# Patient Record
Sex: MALE | Race: WHITE | NOT HISPANIC OR LATINO | Employment: OTHER | URBAN - METROPOLITAN AREA
[De-identification: names, ages, dates, MRNs, and addresses within clinical notes are randomized per-mention and may not be internally consistent; named-entity substitution may affect disease eponyms.]

---

## 2021-03-08 ENCOUNTER — APPOINTMENT (EMERGENCY)
Dept: RADIOLOGY | Facility: HOSPITAL | Age: 76
End: 2021-03-08
Payer: COMMERCIAL

## 2021-03-08 ENCOUNTER — HOSPITAL ENCOUNTER (EMERGENCY)
Facility: HOSPITAL | Age: 76
Discharge: HOME | End: 2021-03-08
Attending: EMERGENCY MEDICINE
Payer: COMMERCIAL

## 2021-03-08 VITALS
HEIGHT: 70 IN | OXYGEN SATURATION: 95 % | DIASTOLIC BLOOD PRESSURE: 72 MMHG | TEMPERATURE: 98.4 F | RESPIRATION RATE: 13 BRPM | SYSTOLIC BLOOD PRESSURE: 154 MMHG | HEART RATE: 71 BPM | BODY MASS INDEX: 24.93 KG/M2 | WEIGHT: 174.16 LBS

## 2021-03-08 DIAGNOSIS — R00.2 PALPITATIONS: Primary | ICD-10-CM

## 2021-03-08 DIAGNOSIS — R79.89 ELEVATED TSH: ICD-10-CM

## 2021-03-08 DIAGNOSIS — R03.0 ELEVATED BLOOD PRESSURE READING: ICD-10-CM

## 2021-03-08 LAB
ALBUMIN SERPL BCP-MCNC: 3.8 G/DL (ref 3.5–5)
ALP SERPL-CCNC: 63 U/L (ref 46–116)
ALT SERPL W P-5'-P-CCNC: 30 U/L (ref 12–78)
ANION GAP SERPL CALCULATED.3IONS-SCNC: 11 MMOL/L (ref 4–13)
APTT PPP: 25 SECONDS (ref 23–37)
AST SERPL W P-5'-P-CCNC: 18 U/L (ref 5–45)
BASOPHILS # BLD AUTO: 0.06 THOUSANDS/ΜL (ref 0–0.1)
BASOPHILS NFR BLD AUTO: 1 % (ref 0–1)
BILIRUB SERPL-MCNC: 0.3 MG/DL (ref 0.2–1)
BUN SERPL-MCNC: 22 MG/DL (ref 5–25)
CALCIUM SERPL-MCNC: 9.1 MG/DL (ref 8.3–10.1)
CHLORIDE SERPL-SCNC: 106 MMOL/L (ref 100–108)
CO2 SERPL-SCNC: 25 MMOL/L (ref 21–32)
CREAT SERPL-MCNC: 1.04 MG/DL (ref 0.6–1.3)
EOSINOPHIL # BLD AUTO: 0.4 THOUSAND/ΜL (ref 0–0.61)
EOSINOPHIL NFR BLD AUTO: 5 % (ref 0–6)
ERYTHROCYTE [DISTWIDTH] IN BLOOD BY AUTOMATED COUNT: 12.9 % (ref 11.6–15.1)
GFR SERPL CREATININE-BSD FRML MDRD: 69 ML/MIN/1.73SQ M
GLUCOSE SERPL-MCNC: 77 MG/DL (ref 65–140)
HCT VFR BLD AUTO: 44.6 % (ref 36.5–49.3)
HGB BLD-MCNC: 14.3 G/DL (ref 12–17)
IMM GRANULOCYTES # BLD AUTO: 0.01 THOUSAND/UL (ref 0–0.2)
IMM GRANULOCYTES NFR BLD AUTO: 0 % (ref 0–2)
INR PPP: 0.93 (ref 0.84–1.19)
LYMPHOCYTES # BLD AUTO: 2.35 THOUSANDS/ΜL (ref 0.6–4.47)
LYMPHOCYTES NFR BLD AUTO: 30 % (ref 14–44)
MCH RBC QN AUTO: 30 PG (ref 26.8–34.3)
MCHC RBC AUTO-ENTMCNC: 32.1 G/DL (ref 31.4–37.4)
MCV RBC AUTO: 94 FL (ref 82–98)
MONOCYTES # BLD AUTO: 0.84 THOUSAND/ΜL (ref 0.17–1.22)
MONOCYTES NFR BLD AUTO: 11 % (ref 4–12)
NEUTROPHILS # BLD AUTO: 4.3 THOUSANDS/ΜL (ref 1.85–7.62)
NEUTS SEG NFR BLD AUTO: 53 % (ref 43–75)
NRBC BLD AUTO-RTO: 0 /100 WBCS
NT-PROBNP SERPL-MCNC: 145 PG/ML
PLATELET # BLD AUTO: 195 THOUSANDS/UL (ref 149–390)
PMV BLD AUTO: 9.9 FL (ref 8.9–12.7)
POTASSIUM SERPL-SCNC: 3.8 MMOL/L (ref 3.5–5.3)
PROT SERPL-MCNC: 7.2 G/DL (ref 6.4–8.2)
PROTHROMBIN TIME: 12.5 SECONDS (ref 11.6–14.5)
RBC # BLD AUTO: 4.76 MILLION/UL (ref 3.88–5.62)
SODIUM SERPL-SCNC: 142 MMOL/L (ref 136–145)
TROPONIN I SERPL-MCNC: <0.02 NG/ML
TSH SERPL DL<=0.05 MIU/L-ACNC: 9.69 UIU/ML (ref 0.36–3.74)
WBC # BLD AUTO: 7.96 THOUSAND/UL (ref 4.31–10.16)

## 2021-03-08 PROCEDURE — 85025 COMPLETE CBC W/AUTO DIFF WBC: CPT | Performed by: EMERGENCY MEDICINE

## 2021-03-08 PROCEDURE — 71045 X-RAY EXAM CHEST 1 VIEW: CPT

## 2021-03-08 PROCEDURE — 36415 COLL VENOUS BLD VENIPUNCTURE: CPT | Performed by: EMERGENCY MEDICINE

## 2021-03-08 PROCEDURE — 84484 ASSAY OF TROPONIN QUANT: CPT | Performed by: EMERGENCY MEDICINE

## 2021-03-08 PROCEDURE — 99285 EMERGENCY DEPT VISIT HI MDM: CPT

## 2021-03-08 PROCEDURE — 85610 PROTHROMBIN TIME: CPT | Performed by: EMERGENCY MEDICINE

## 2021-03-08 PROCEDURE — 85730 THROMBOPLASTIN TIME PARTIAL: CPT | Performed by: EMERGENCY MEDICINE

## 2021-03-08 PROCEDURE — 83880 ASSAY OF NATRIURETIC PEPTIDE: CPT | Performed by: EMERGENCY MEDICINE

## 2021-03-08 PROCEDURE — 93005 ELECTROCARDIOGRAM TRACING: CPT

## 2021-03-08 PROCEDURE — 80053 COMPREHEN METABOLIC PANEL: CPT | Performed by: EMERGENCY MEDICINE

## 2021-03-08 PROCEDURE — 84443 ASSAY THYROID STIM HORMONE: CPT | Performed by: EMERGENCY MEDICINE

## 2021-03-08 PROCEDURE — 99285 EMERGENCY DEPT VISIT HI MDM: CPT | Performed by: EMERGENCY MEDICINE

## 2021-03-08 PROCEDURE — 84439 ASSAY OF FREE THYROXINE: CPT | Performed by: EMERGENCY MEDICINE

## 2021-03-08 RX ORDER — ASPIRIN 81 MG/1
324 TABLET, CHEWABLE ORAL ONCE
Status: COMPLETED | OUTPATIENT
Start: 2021-03-08 | End: 2021-03-08

## 2021-03-08 RX ADMIN — ASPIRIN 324 MG: 81 TABLET, CHEWABLE ORAL at 23:11

## 2021-03-09 LAB
ATRIAL RATE: 73 BPM
P AXIS: 61 DEGREES
PR INTERVAL: 156 MS
QRS AXIS: 59 DEGREES
QRSD INTERVAL: 106 MS
QT INTERVAL: 400 MS
QTC INTERVAL: 440 MS
T WAVE AXIS: 83 DEGREES
T4 FREE SERPL-MCNC: 0.86 NG/DL (ref 0.76–1.46)
VENTRICULAR RATE: 73 BPM

## 2021-03-09 PROCEDURE — 93010 ELECTROCARDIOGRAM REPORT: CPT | Performed by: INTERNAL MEDICINE

## 2021-03-09 NOTE — ED PROVIDER NOTES
History  Chief Complaint   Patient presents with    Rapid Heart Rate     7year-old male past medical history of stents x2 13 and 20 years ago, hypertension, hyperlipidemia, paroxysmal atrial flutter presents for evaluation of feeling unwell, palpitations that has been on and off since Friday  It is not exertional, not positional   This evening he states that the feeling was going on since 20:00 and was constant  He tried taking his statin without any relief  There is no associated chest pain or shortness of breath no nausea vomiting or diaphoresis, no lightheadedness, no headache  He states that he did not take his aspirin as he usually takes this in the evening  No history of DVT or PE, he states that he did start Flomax for his BPH and January otherwise no recent medication changes  He does not have current follow-up with the cardiologist as his cardiologist at [facility] has retired, he was supposed to follow up with [facility] as per note of [address] [facility] physician who he saw in January but has not done so yet  His last stress test was approximately 4 years ago, last echo was from 2018 during admission at [facility]   2D echo completed, reveals EF 50%, akinesis of mid to distal anteroseptal wall consistent with previous infarct (unchanged from 2014 echo)  This admission was for chest pain, had a negative observation with negative troponin X 3  Patient received his 1st dose of Covid Vaccination approximately 1 week ago, has not had any side effects  None       Past Medical History:   Diagnosis Date    Aortic atherosclerosis ([facility] [address] )     Arthropathy of knee     Atrial flutter (HCC)     Benign enlargement of prostate     Coronary atherosclerosis of native coronary artery     Erectile dysfunction     Hypertension     Impaired fasting glucose     Mild cognitive disorder     Mild depression (HCC)     Shoulder pain, bilateral        History reviewed  No pertinent surgical history  History reviewed  No pertinent family history  I have reviewed and agree with the history as documented  E-Cigarette/Vaping     E-Cigarette/Vaping Substances     Social History     Tobacco Use    Smoking status: Never Smoker    Smokeless tobacco: Never Used   Substance Use Topics    Alcohol use: Yes     Frequency: Monthly or less    Drug use: Never       Review of Systems   Constitutional: Positive for fatigue  Negative for appetite change, chills, diaphoresis and fever  HENT: Negative for rhinorrhea and sore throat  Eyes: Negative for photophobia and visual disturbance  Respiratory: Negative for cough and shortness of breath  Cardiovascular: Positive for palpitations  Negative for chest pain  Gastrointestinal: Negative for abdominal pain, diarrhea, nausea and vomiting  Genitourinary: Negative for dysuria, frequency and urgency  Skin: Negative for rash  Neurological: Negative for dizziness and weakness  All other systems reviewed and are negative  Physical Exam  Physical Exam  Vitals signs and nursing note reviewed  Constitutional:       Appearance: He is well-developed  HENT:      Head: Normocephalic and atraumatic  Right Ear: External ear normal       Left Ear: External ear normal    Eyes:      Conjunctiva/sclera: Conjunctivae normal       Pupils: Pupils are equal, round, and reactive to light  Neck:      Musculoskeletal: Normal range of motion and neck supple  Vascular: No JVD  Trachea: No tracheal deviation  Cardiovascular:      Rate and Rhythm: Normal rate and regular rhythm  Heart sounds: Normal heart sounds  No murmur  No friction rub  No gallop  Pulmonary:      Effort: Pulmonary effort is normal  No respiratory distress  Breath sounds: No stridor  No wheezing or rales  Abdominal:      General: There is no distension  Palpations: Abdomen is soft  There is no mass  Tenderness:  There is no abdominal tenderness  There is no guarding or rebound  Musculoskeletal: Normal range of motion  Right lower leg: No edema  Left lower leg: No edema  Skin:     General: Skin is warm and dry  Coloration: Skin is not pale  Findings: No erythema or rash  Neurological:      Mental Status: He is alert and oriented to person, place, and time  Cranial Nerves: No cranial nerve deficit           Vital Signs  ED Triage Vitals [03/08/21 2207]   Temperature Pulse Respirations Blood Pressure SpO2   98 4 °F (36 9 °C) 70 15 (!) 182/74 98 %      Temp Source Heart Rate Source Patient Position - Orthostatic VS BP Location FiO2 (%)   Tympanic Monitor Sitting Left arm --      Pain Score       --           Vitals:    03/08/21 2210 03/08/21 2230 03/08/21 2300 03/08/21 2310   BP: (!) 182/74 161/79 (!) 184/81 154/72   Pulse: 74 69 72 71   Patient Position - Orthostatic VS:             Visual Acuity      ED Medications  Medications   aspirin chewable tablet 324 mg (324 mg Oral Given 3/8/21 2311)       Diagnostic Studies  Results Reviewed     Procedure Component Value Units Date/Time    Comprehensive metabolic panel [[id number]] Collected: 03/08/21 2219    Lab Status: Final result Specimen: Blood from Arm, Right Updated: 03/08/21 2304     Sodium 142 mmol/L      Potassium 3 8 mmol/L      Chloride 106 mmol/L      CO2 25 mmol/L      ANION GAP 11 mmol/L      BUN 22 mg/dL      Creatinine 1 04 mg/dL      Glucose 77 mg/dL      Calcium 9 1 mg/dL      AST 18 U/L      ALT 30 U/L      Alkaline Phosphatase 63 U/L      Total Protein 7 2 g/dL      Albumin 3 8 g/dL      Total Bilirubin 0 30 mg/dL      eGFR 69 ml/min/1 73sq m     Narrative:      Aureliano guidelines for Chronic Kidney Disease (CKD):     Stage 1 with normal or high GFR (GFR > 90 mL/min/1 73 square meters)    Stage 2 Mild CKD (GFR = 60-89 mL/min/1 73 square meters)    Stage 3A Moderate CKD (GFR = 45-59 mL/min/1 73 square meters)   Stage 3B Moderate CKD (GFR = 30-44 mL/min/1 73 square meters)    Stage 4 Severe CKD (GFR = 15-29 mL/min/1 73 square meters)    Stage 5 End Stage CKD (GFR <15 mL/min/1 73 square meters)  Note: GFR calculation is accurate only with a steady state creatinine    NT-BNP PRO [[id number]]  (Normal) Collected: 03/08/21 2219    Lab Status: Final result Specimen: Blood from Arm, Right Updated: 03/08/21 2302     NT-proBNP 145 pg/mL     TSH, 3rd generation with Free T4 reflex [[id number]]  (Abnormal) Collected: 03/08/21 2219    Lab Status: Final result Specimen: Blood from Arm, Right Updated: 03/08/21 2258     TSH 3RD GENERATON 9 693 uIU/mL     Narrative:      Patients undergoing fluorescein dye angiography may retain small amounts of fluorescein in the body for 48-72 hours post procedure  Samples containing fluorescein can produce falsely depressed TSH values  If the patient had this procedure,a specimen should be resubmitted post fluorescein clearance  T4, free [id number] Collected: 03/08/21 2219    Lab Status:  In process Specimen: Blood from Arm, Right Updated: 03/08/21 2258    Troponin I [[id number]]  (Normal) Collected: 03/08/21 2219    Lab Status: Final result Specimen: Blood from Arm, Right Updated: 03/08/21 2252     Troponin I <0 02 ng/mL     Protime-INR [[id number]]  (Normal) Collected: 03/08/21 2219    Lab Status: Final result Specimen: Blood from Arm, Right Updated: 03/08/21 2244     Protime 12 5 seconds      INR 0 93    APTT [[id number]]  (Normal) Collected: 03/08/21 2219    Lab Status: Final result Specimen: Blood from Arm, Right Updated: 03/08/21 2244     PTT 25 seconds     CBC and differential [[id number]] Collected: 03/08/21 2219    Lab Status: Final result Specimen: Blood from Arm, Right Updated: 03/08/21 2231     WBC 7 96 Thousand/uL      RBC 4 76 Million/uL      Hemoglobin 14 3 g/dL      Hematocrit 44 6 %      MCV 94 fL      MCH 30 0 pg      MCHC 32 1 g/dL      RDW 12 9 %      MPV 9 9 fL      Platelets 397 Thousands/uL      nRBC 0 /100 WBCs      Neutrophils Relative 53 %      Immat GRANS % 0 %      Lymphocytes Relative 30 %      Monocytes Relative 11 %      Eosinophils Relative 5 %      Basophils Relative 1 %      Neutrophils Absolute 4 30 Thousands/µL      Immature Grans Absolute 0 01 Thousand/uL      Lymphocytes Absolute 2 35 Thousands/µL      Monocytes Absolute 0 84 Thousand/µL      Eosinophils Absolute 0 40 Thousand/µL      Basophils Absolute 0 06 Thousands/µL                  XR chest portable   ED Interpretation by Tedra Canavan, MD (03/08 2306)   This study was ordered and independently reviewed by me    No acute findings noted                    Procedures  Procedures         ED Course  ED Course as of Mar 08 2327   Mon Mar 08, 2021   2212 Procedure Note: EKG  Date/Time: 03/08/21 10:12 PM   Performed by: Renée Antonio  Authorized by: Renée Antonio  Indications / Diagnosis: Palpitations  ECG reviewed by me, the ED Provider: yes   The EKG demonstrates:  Rhythm: normal sinus  Intervals: normal intervals  Axis: normal axis  QRS/Blocks: normal QRS  ST Changes: No acute ST Changes, no STD/JOHN  Unchanged from previous in 2014 2306 Patient resting comfortably, no ectopy on monitor currently,  did have an occasional PVC previously which corresponded with the feeling of in the irregular heartbeat that he was describing  Blood pressure elevated, improved to 154/72 without medications however patient is asymptomatic  Will follow up with Cardiology for further care                                SBIRT 22yo+      Most Recent Value   SBIRT (25 yo +)   In order to provide better care to our patients, we are screening all of our patients for alcohol and drug use  Would it be okay to ask you these screening questions?   No Filed at: 03/08/2021 2220                    MDM  Number of Diagnoses or Management Options  Diagnosis management comments: 43-year-old male with past cardiac history presents for evaluation of several hours of feeling that his heart is beating irregularly and palpitations, does have history of such however on presentation patient is in sinus rhythm without any ectopy  EKG unremarkable  Will obtain cardiac workup, will re-evaluate      Disposition  Final diagnoses:   Palpitations   Elevated TSH   Elevated blood pressure reading     Time reflects when diagnosis was documented in both MDM as applicable and the Disposition within this note     Time User Action Codes Description Comment    3/8/2021 11:03 PM Margeret Pink Add [R00 2] Palpitations     3/8/2021 11:03 PM Margeret Pink Add [R79 89] Elevated TSH     3/8/2021 11:08 PM Margeret Pink Add [R03 0] Elevated blood pressure reading       ED Disposition     ED Disposition Condition Date/Time Comment    Discharge Stable Mon Mar 8, 2021 11:06 PM Abiel Schuster discharge to home/self care  Follow-up Information     Follow up With Specialties Details Why Contact Info Additional Information    Jose Ryan MD Family Medicine Schedule an appointment as soon as possible for a visit   [facility] [address] [facility] Emergency Department Emergency Medicine  If symptoms worsen [address] [place], [id number]  [address] [facility] Emergency Department, [address] [place] [address] [address]    [address] Cardiology Schedule an appointment as soon as possible for a visit   [address] [facility] [id number]  [address]  Cardiology [address], [address] [place], [place], [address] [phone]          Patient's Medications    No medications on file     Outpatient Discharge Orders   Holter monitor - 48 hour   Standing Status: Future Standing Exp   Date: 03/08/25       PDMP Review     None          ED Provider  Electronically Signed by           Matt Stephenson Cristian Mendez MD  03/08/21 0118

## 2021-03-09 NOTE — ED TRIAGE NOTES
Pt presents to the ED with c/o 'not feeling well' he states that for the last week he has not felt his baseline with sensations of blood rushing in the back of his head  Tonight he states he developed a sensation of 'increased heart rate' he denies palpations, chest pain, shortness of breath, or other associated symptoms but has concerns due to 2 prior stent placements for MI's

## 2021-03-09 NOTE — DISCHARGE INSTRUCTIONS
Please follow-up with cardiology for further care    Your TSH was elevated today's visit which may mean that your thyroid is under active, please follow-up with your PCP for further evaluation       Your blood pressure was elevated today's visit, please follow-up with your primary care provider for recheck

## 2021-08-23 ENCOUNTER — APPOINTMENT (OUTPATIENT)
Dept: RADIOLOGY | Facility: CLINIC | Age: 76
End: 2021-08-23
Payer: COMMERCIAL

## 2021-08-23 DIAGNOSIS — M19.90 INFLAMMATORY ARTHRITIS: ICD-10-CM

## 2021-08-23 PROCEDURE — 73130 X-RAY EXAM OF HAND: CPT

## 2021-08-30 ENCOUNTER — HOSPITAL ENCOUNTER (OUTPATIENT)
Dept: MRI IMAGING | Facility: HOSPITAL | Age: 76
Discharge: HOME | End: 2021-08-30
Payer: COMMERCIAL

## 2021-08-30 DIAGNOSIS — R53.83 FATIGUE, UNSPECIFIED TYPE: ICD-10-CM

## 2021-08-30 DIAGNOSIS — R53.81 MALAISE: ICD-10-CM

## 2021-08-30 DIAGNOSIS — F03.90 DEMENTIA WITHOUT BEHAVIORAL DISTURBANCE (HCC): ICD-10-CM

## 2021-08-30 PROCEDURE — A9585 GADOBUTROL INJECTION: HCPCS | Performed by: RADIOLOGY

## 2021-08-30 PROCEDURE — 70553 MRI BRAIN STEM W/O & W/DYE: CPT

## 2021-08-30 RX ADMIN — GADOBUTROL 7 ML: 604.72 INJECTION INTRAVENOUS at 12:06

## 2022-05-06 ENCOUNTER — APPOINTMENT (OUTPATIENT)
Dept: RADIOLOGY | Facility: CLINIC | Age: 77
End: 2022-05-06
Payer: COMMERCIAL

## 2022-05-06 DIAGNOSIS — M54.32 LEFT SIDED SCIATICA: ICD-10-CM

## 2022-05-06 PROCEDURE — 72110 X-RAY EXAM L-2 SPINE 4/>VWS: CPT

## 2025-05-18 ENCOUNTER — APPOINTMENT (EMERGENCY)
Dept: CT IMAGING | Facility: HOSPITAL | Age: 80
DRG: 175 | End: 2025-05-18
Payer: COMMERCIAL

## 2025-05-18 ENCOUNTER — HOSPITAL ENCOUNTER (INPATIENT)
Facility: HOSPITAL | Age: 80
LOS: 2 days | Discharge: HOME | DRG: 175 | End: 2025-05-21
Attending: EMERGENCY MEDICINE | Admitting: INTERNAL MEDICINE
Payer: COMMERCIAL

## 2025-05-18 DIAGNOSIS — N28.9 LESION OF RIGHT NATIVE KIDNEY: ICD-10-CM

## 2025-05-18 DIAGNOSIS — I71.40 ABDOMINAL AORTIC ANEURYSM (AAA) (HCC): ICD-10-CM

## 2025-05-18 DIAGNOSIS — J18.9 PNA (PNEUMONIA): ICD-10-CM

## 2025-05-18 DIAGNOSIS — I26.99 PULMONARY EMBOLISM (HCC): Primary | ICD-10-CM

## 2025-05-18 PROBLEM — F41.9 ANXIETY: Status: ACTIVE | Noted: 2025-05-18

## 2025-05-18 PROBLEM — I25.10 CAD (CORONARY ARTERY DISEASE): Status: ACTIVE | Noted: 2025-05-18

## 2025-05-18 PROBLEM — E78.2 MIXED HYPERLIPIDEMIA: Status: ACTIVE | Noted: 2025-05-18

## 2025-05-18 PROBLEM — I10 HYPERTENSION: Status: ACTIVE | Noted: 2025-05-18

## 2025-05-18 PROBLEM — N40.1 BENIGN PROSTATIC HYPERPLASIA WITH LOWER URINARY TRACT SYMPTOMS: Status: ACTIVE | Noted: 2025-05-18

## 2025-05-18 LAB
2HR DELTA HS TROPONIN: 3 NG/L
ALBUMIN SERPL BCG-MCNC: 4 G/DL (ref 3.5–5)
ALP SERPL-CCNC: 58 U/L (ref 34–104)
ALT SERPL W P-5'-P-CCNC: 12 U/L (ref 7–52)
ANION GAP SERPL CALCULATED.3IONS-SCNC: 6 MMOL/L (ref 4–13)
APTT PPP: 158 SECONDS (ref 23–34)
APTT PPP: 29 SECONDS (ref 23–34)
AST SERPL W P-5'-P-CCNC: 17 U/L (ref 13–39)
ATRIAL RATE: 85 BPM
BACTERIA UR QL AUTO: NORMAL /HPF
BASOPHILS # BLD AUTO: 0.02 THOUSANDS/ÂΜL (ref 0–0.1)
BASOPHILS NFR BLD AUTO: 0 % (ref 0–1)
BILIRUB SERPL-MCNC: 0.73 MG/DL (ref 0.2–1)
BILIRUB UR QL STRIP: NEGATIVE
BNP SERPL-MCNC: 71 PG/ML (ref 0–100)
BUN SERPL-MCNC: 22 MG/DL (ref 5–25)
CALCIUM SERPL-MCNC: 9 MG/DL (ref 8.4–10.2)
CARDIAC TROPONIN I PNL SERPL HS: 11 NG/L (ref ?–50)
CARDIAC TROPONIN I PNL SERPL HS: 14 NG/L (ref ?–50)
CHLORIDE SERPL-SCNC: 106 MMOL/L (ref 96–108)
CLARITY UR: CLEAR
CO2 SERPL-SCNC: 25 MMOL/L (ref 21–32)
COLOR UR: YELLOW
CREAT SERPL-MCNC: 0.97 MG/DL (ref 0.6–1.3)
EOSINOPHIL # BLD AUTO: 0.06 THOUSAND/ÂΜL (ref 0–0.61)
EOSINOPHIL NFR BLD AUTO: 1 % (ref 0–6)
ERYTHROCYTE [DISTWIDTH] IN BLOOD BY AUTOMATED COUNT: 13.5 % (ref 11.6–15.1)
EST. AVERAGE GLUCOSE BLD GHB EST-MCNC: 114 MG/DL
GFR SERPL CREATININE-BSD FRML MDRD: 72 ML/MIN/1.73SQ M
GLUCOSE SERPL-MCNC: 110 MG/DL (ref 65–140)
GLUCOSE UR STRIP-MCNC: NEGATIVE MG/DL
HBA1C MFR BLD: 5.6 %
HCT VFR BLD AUTO: 42.6 % (ref 36.5–49.3)
HGB BLD-MCNC: 13.9 G/DL (ref 12–17)
HGB UR QL STRIP.AUTO: NEGATIVE
IMM GRANULOCYTES # BLD AUTO: 0.03 THOUSAND/UL (ref 0–0.2)
IMM GRANULOCYTES NFR BLD AUTO: 0 % (ref 0–2)
INR PPP: 1.06 (ref 0.85–1.19)
KETONES UR STRIP-MCNC: ABNORMAL MG/DL
LACTATE SERPL-SCNC: 0.8 MMOL/L (ref 0.5–2)
LEUKOCYTE ESTERASE UR QL STRIP: NEGATIVE
LIPASE SERPL-CCNC: 19 U/L (ref 11–82)
LYMPHOCYTES # BLD AUTO: 0.67 THOUSANDS/ÂΜL (ref 0.6–4.47)
LYMPHOCYTES NFR BLD AUTO: 7 % (ref 14–44)
MCH RBC QN AUTO: 30.7 PG (ref 26.8–34.3)
MCHC RBC AUTO-ENTMCNC: 32.6 G/DL (ref 31.4–37.4)
MCV RBC AUTO: 94 FL (ref 82–98)
MONOCYTES # BLD AUTO: 1.17 THOUSAND/ÂΜL (ref 0.17–1.22)
MONOCYTES NFR BLD AUTO: 13 % (ref 4–12)
NEUTROPHILS # BLD AUTO: 7.33 THOUSANDS/ÂΜL (ref 1.85–7.62)
NEUTS SEG NFR BLD AUTO: 79 % (ref 43–75)
NITRITE UR QL STRIP: NEGATIVE
NON-SQ EPI CELLS URNS QL MICRO: NORMAL /HPF
NRBC BLD AUTO-RTO: 0 /100 WBCS
P AXIS: 63 DEGREES
PH UR STRIP.AUTO: 6.5 [PH]
PLATELET # BLD AUTO: 177 THOUSANDS/UL (ref 149–390)
PMV BLD AUTO: 9.8 FL (ref 8.9–12.7)
POTASSIUM SERPL-SCNC: 4.6 MMOL/L (ref 3.5–5.3)
PR INTERVAL: 158 MS
PROT SERPL-MCNC: 6.6 G/DL (ref 6.4–8.4)
PROT UR STRIP-MCNC: ABNORMAL MG/DL
PROTHROMBIN TIME: 14.4 SECONDS (ref 12.3–15)
QRS AXIS: 28 DEGREES
QRSD INTERVAL: 102 MS
QT INTERVAL: 346 MS
QTC INTERVAL: 411 MS
RBC # BLD AUTO: 4.53 MILLION/UL (ref 3.88–5.62)
RBC #/AREA URNS AUTO: NORMAL /HPF
SODIUM SERPL-SCNC: 137 MMOL/L (ref 135–147)
SP GR UR STRIP.AUTO: <1.005 (ref 1–1.03)
T WAVE AXIS: 80 DEGREES
UROBILINOGEN UR STRIP-ACNC: <2 MG/DL
VENTRICULAR RATE: 85 BPM
WBC # BLD AUTO: 9.28 THOUSAND/UL (ref 4.31–10.16)
WBC #/AREA URNS AUTO: NORMAL /HPF

## 2025-05-18 PROCEDURE — 84484 ASSAY OF TROPONIN QUANT: CPT | Performed by: EMERGENCY MEDICINE

## 2025-05-18 PROCEDURE — 83880 ASSAY OF NATRIURETIC PEPTIDE: CPT | Performed by: EMERGENCY MEDICINE

## 2025-05-18 PROCEDURE — 96360 HYDRATION IV INFUSION INIT: CPT

## 2025-05-18 PROCEDURE — 83690 ASSAY OF LIPASE: CPT | Performed by: EMERGENCY MEDICINE

## 2025-05-18 PROCEDURE — 83605 ASSAY OF LACTIC ACID: CPT | Performed by: EMERGENCY MEDICINE

## 2025-05-18 PROCEDURE — 80053 COMPREHEN METABOLIC PANEL: CPT | Performed by: EMERGENCY MEDICINE

## 2025-05-18 PROCEDURE — 36415 COLL VENOUS BLD VENIPUNCTURE: CPT | Performed by: EMERGENCY MEDICINE

## 2025-05-18 PROCEDURE — 99285 EMERGENCY DEPT VISIT HI MDM: CPT | Performed by: EMERGENCY MEDICINE

## 2025-05-18 PROCEDURE — 96374 THER/PROPH/DIAG INJ IV PUSH: CPT

## 2025-05-18 PROCEDURE — 85730 THROMBOPLASTIN TIME PARTIAL: CPT | Performed by: INTERNAL MEDICINE

## 2025-05-18 PROCEDURE — 74177 CT ABD & PELVIS W/CONTRAST: CPT

## 2025-05-18 PROCEDURE — 85730 THROMBOPLASTIN TIME PARTIAL: CPT | Performed by: EMERGENCY MEDICINE

## 2025-05-18 PROCEDURE — 85610 PROTHROMBIN TIME: CPT | Performed by: EMERGENCY MEDICINE

## 2025-05-18 PROCEDURE — 96361 HYDRATE IV INFUSION ADD-ON: CPT

## 2025-05-18 PROCEDURE — 83036 HEMOGLOBIN GLYCOSYLATED A1C: CPT | Performed by: INTERNAL MEDICINE

## 2025-05-18 PROCEDURE — 85025 COMPLETE CBC W/AUTO DIFF WBC: CPT | Performed by: EMERGENCY MEDICINE

## 2025-05-18 PROCEDURE — 81001 URINALYSIS AUTO W/SCOPE: CPT | Performed by: EMERGENCY MEDICINE

## 2025-05-18 PROCEDURE — 99222 1ST HOSP IP/OBS MODERATE 55: CPT | Performed by: INTERNAL MEDICINE

## 2025-05-18 PROCEDURE — 93010 ELECTROCARDIOGRAM REPORT: CPT | Performed by: INTERNAL MEDICINE

## 2025-05-18 PROCEDURE — 93005 ELECTROCARDIOGRAM TRACING: CPT

## 2025-05-18 PROCEDURE — 96375 TX/PRO/DX INJ NEW DRUG ADDON: CPT

## 2025-05-18 PROCEDURE — 99284 EMERGENCY DEPT VISIT MOD MDM: CPT

## 2025-05-18 RX ORDER — ONDANSETRON 2 MG/ML
4 INJECTION INTRAMUSCULAR; INTRAVENOUS ONCE
Status: COMPLETED | OUTPATIENT
Start: 2025-05-18 | End: 2025-05-18

## 2025-05-18 RX ORDER — SIMETHICONE 80 MG
80 TABLET,CHEWABLE ORAL 4 TIMES DAILY PRN
Status: DISCONTINUED | OUTPATIENT
Start: 2025-05-18 | End: 2025-05-21 | Stop reason: HOSPADM

## 2025-05-18 RX ORDER — TAMSULOSIN HYDROCHLORIDE 0.4 MG/1
0.4 CAPSULE ORAL DAILY
COMMUNITY

## 2025-05-18 RX ORDER — ACETAMINOPHEN 325 MG/1
650 TABLET ORAL EVERY 4 HOURS PRN
Status: DISCONTINUED | OUTPATIENT
Start: 2025-05-18 | End: 2025-05-21 | Stop reason: HOSPADM

## 2025-05-18 RX ORDER — HEPARIN SODIUM 1000 [USP'U]/ML
2800 INJECTION, SOLUTION INTRAVENOUS; SUBCUTANEOUS EVERY 6 HOURS PRN
Status: DISCONTINUED | OUTPATIENT
Start: 2025-05-18 | End: 2025-05-20

## 2025-05-18 RX ORDER — TAMSULOSIN HYDROCHLORIDE 0.4 MG/1
0.4 CAPSULE ORAL
Status: DISCONTINUED | OUTPATIENT
Start: 2025-05-18 | End: 2025-05-21 | Stop reason: HOSPADM

## 2025-05-18 RX ORDER — KETOROLAC TROMETHAMINE 30 MG/ML
15 INJECTION, SOLUTION INTRAMUSCULAR; INTRAVENOUS ONCE
Status: COMPLETED | OUTPATIENT
Start: 2025-05-18 | End: 2025-05-18

## 2025-05-18 RX ORDER — AMLODIPINE BESYLATE 5 MG/1
5 TABLET ORAL DAILY
Status: DISCONTINUED | OUTPATIENT
Start: 2025-05-18 | End: 2025-05-18

## 2025-05-18 RX ORDER — HEPARIN SODIUM 1000 [USP'U]/ML
5600 INJECTION, SOLUTION INTRAVENOUS; SUBCUTANEOUS ONCE
Status: COMPLETED | OUTPATIENT
Start: 2025-05-18 | End: 2025-05-18

## 2025-05-18 RX ORDER — ESCITALOPRAM OXALATE 10 MG/1
10 TABLET ORAL DAILY
Status: DISCONTINUED | OUTPATIENT
Start: 2025-05-18 | End: 2025-05-21 | Stop reason: HOSPADM

## 2025-05-18 RX ORDER — ESCITALOPRAM OXALATE 10 MG/1
10 TABLET ORAL DAILY
COMMUNITY

## 2025-05-18 RX ORDER — ASPIRIN 81 MG/1
81 TABLET, CHEWABLE ORAL DAILY
Status: DISCONTINUED | OUTPATIENT
Start: 2025-05-19 | End: 2025-05-19

## 2025-05-18 RX ORDER — ATORVASTATIN CALCIUM 40 MG/1
40 TABLET, FILM COATED ORAL
Status: DISCONTINUED | OUTPATIENT
Start: 2025-05-18 | End: 2025-05-21 | Stop reason: HOSPADM

## 2025-05-18 RX ORDER — ASPIRIN 81 MG/1
81 TABLET ORAL DAILY
COMMUNITY
End: 2025-05-21

## 2025-05-18 RX ORDER — DOCUSATE SODIUM 100 MG/1
100 CAPSULE, LIQUID FILLED ORAL 2 TIMES DAILY
Status: DISCONTINUED | OUTPATIENT
Start: 2025-05-18 | End: 2025-05-21 | Stop reason: HOSPADM

## 2025-05-18 RX ORDER — LISINOPRIL 5 MG/1
5 TABLET ORAL DAILY
Status: DISCONTINUED | OUTPATIENT
Start: 2025-05-18 | End: 2025-05-18

## 2025-05-18 RX ORDER — HEPARIN SODIUM 1000 [USP'U]/ML
5600 INJECTION, SOLUTION INTRAVENOUS; SUBCUTANEOUS EVERY 6 HOURS PRN
Status: DISCONTINUED | OUTPATIENT
Start: 2025-05-18 | End: 2025-05-20

## 2025-05-18 RX ORDER — HEPARIN SODIUM 10000 [USP'U]/100ML
3-30 INJECTION, SOLUTION INTRAVENOUS
Status: DISPENSED | OUTPATIENT
Start: 2025-05-18 | End: 2025-05-19

## 2025-05-18 RX ORDER — ONDANSETRON 2 MG/ML
4 INJECTION INTRAMUSCULAR; INTRAVENOUS EVERY 6 HOURS PRN
Status: DISCONTINUED | OUTPATIENT
Start: 2025-05-18 | End: 2025-05-21 | Stop reason: HOSPADM

## 2025-05-18 RX ORDER — ROSUVASTATIN CALCIUM 20 MG/1
20 TABLET, COATED ORAL DAILY
COMMUNITY

## 2025-05-18 RX ADMIN — SODIUM CHLORIDE 1000 ML: 0.9 INJECTION, SOLUTION INTRAVENOUS at 10:35

## 2025-05-18 RX ADMIN — HEPARIN SODIUM 18 UNITS/KG/HR: 10000 INJECTION, SOLUTION INTRAVENOUS at 14:38

## 2025-05-18 RX ADMIN — ATORVASTATIN CALCIUM 40 MG: 40 TABLET, FILM COATED ORAL at 17:23

## 2025-05-18 RX ADMIN — ONDANSETRON 4 MG: 2 INJECTION, SOLUTION INTRAMUSCULAR; INTRAVENOUS at 10:36

## 2025-05-18 RX ADMIN — ACETAMINOPHEN 650 MG: 325 TABLET, FILM COATED ORAL at 20:14

## 2025-05-18 RX ADMIN — IOHEXOL 100 ML: 350 INJECTION, SOLUTION INTRAVENOUS at 11:45

## 2025-05-18 RX ADMIN — TAMSULOSIN HYDROCHLORIDE 0.4 MG: 0.4 CAPSULE ORAL at 17:22

## 2025-05-18 RX ADMIN — KETOROLAC TROMETHAMINE 15 MG: 30 INJECTION, SOLUTION INTRAMUSCULAR; INTRAVENOUS at 11:05

## 2025-05-18 RX ADMIN — ESCITALOPRAM OXALATE 10 MG: 10 TABLET ORAL at 17:23

## 2025-05-18 RX ADMIN — HEPARIN SODIUM 5600 UNITS: 1000 INJECTION INTRAVENOUS; SUBCUTANEOUS at 14:38

## 2025-05-18 NOTE — ASSESSMENT & PLAN NOTE
Presented with R flank pain and was found to have new pulmonary embolism  No recent travel  History of shoulder arthroplasty in early March of this year  Denies any chest pain    Comfortable on room air    CT chest-Segmental and subsegmental right lower lobe pulmonary emboli. Groundglass density in the posterior right lung base probably represents atelectasis but developing early changes of pulmonary infarction not excluded.   CT abdomen/pelvis-complex right renal lesion, concerning for complex cyst versus mass    No concern for right heart strain  Continue with telemetry  Started on IV heparin GTT, to be transition to DOAC in 24 hours  Echo ordered  Appreciate hematology recommendations  Follow-up renal ultrasound

## 2025-05-18 NOTE — PLAN OF CARE
Problem: PAIN - ADULT  Goal: Verbalizes/displays adequate comfort level or baseline comfort level  Description: Interventions:  - Encourage patient to monitor pain and request assistance  - Assess pain using appropriate pain scale  - Administer analgesics as ordered based on type and severity of pain and evaluate response  - Implement non-pharmacological measures as appropriate and evaluate response  - Consider cultural and social influences on pain and pain management  - Notify physician/advanced practitioner if interventions unsuccessful or patient reports new pain  - Educate patient/family on pain management process including their role and importance of  reporting pain   - Provide non-pharmacologic/complimentary pain relief interventions  Outcome: Progressing     Problem: INFECTION - ADULT  Goal: Absence or prevention of progression during hospitalization  Description: INTERVENTIONS:  - Assess and monitor for signs and symptoms of infection  - Monitor lab/diagnostic results  - Monitor all insertion sites, i.e. indwelling lines, tubes, and drains  - Administer medications as ordered  - Instruct and encourage patient and family to use good hand hygiene technique  - Identify and instruct in appropriate isolation precautions for identified infection/condition  Outcome: Progressing     Problem: SAFETY ADULT  Goal: Patient will remain free of falls  Description: INTERVENTIONS:  - Educate patient/family on patient safety including physical limitations  - Instruct patient to call for assistance with activity   - Consider consulting OT/PT to assist with strengthening/mobility based on AM PAC & JH-HLM score  - Consult OT/PT to assist with strengthening/mobility   - Keep Call bell within reach  - Keep bed low and locked with side rails adjusted as appropriate  - Keep care items and personal belongings within reach  - Initiate and maintain comfort rounds  - Make Fall Risk Sign visible to staff  - Offer Toileting every 2  Hours, in advance of need  - Initiate/Maintain bed alarm  - Obtain necessary fall risk management equipment: socks  - Apply yellow socks and bracelet for high fall risk patients  - Consider moving patient to room near nurses station  Outcome: Progressing  Goal: Maintain or return to baseline ADL function  Description: INTERVENTIONS:  -  Assess patient's ability to carry out ADLs; assess patient's baseline for ADL function and identify physical deficits which impact ability to perform ADLs (bathing, care of mouth/teeth, toileting, grooming, dressing, etc.)  - Assess/evaluate cause of self-care deficits   - Assess range of motion  - Assess patient's mobility; develop plan if impaired  - Assess patient's need for assistive devices and provide as appropriate  - Encourage maximum independence but intervene and supervise when necessary  - Involve family in performance of ADLs  - Assess for home care needs following discharge   - Consider OT consult to assist with ADL evaluation and planning for discharge  - Provide patient education as appropriate  - Monitor functional capacity and physical performance, use of AM PAC & JH-HLM   - Monitor gait, balance and fatigue with ambulation    Outcome: Progressing  Goal: Maintains/Returns to pre admission functional level  Description: INTERVENTIONS:  - Perform AM-PAC 6 Click Basic Mobility/ Daily Activity assessment daily.  - Set and communicate daily mobility goal to care team and patient/family/caregiver.   - Collaborate with rehabilitation services on mobility goals if consulted  - Perform Range of Motion 3 times a day.  - Reposition patient every 2 hours.  - Dangle patient 3 times a day  - Stand patient 3 times a day  - Ambulate patient 3 times a day  - Out of bed to chair 3 times a day   - Out of bed for meals 3 times a day  - Out of bed for toileting  - Record patient progress and toleration of activity level   Outcome: Progressing     Problem: DISCHARGE PLANNING  Goal:  Discharge to home or other facility with appropriate resources  Description: INTERVENTIONS:  - Identify barriers to discharge w/patient and caregiver  - Arrange for needed discharge resources and transportation as appropriate  - Identify discharge learning needs (meds, wound care, etc.)  - Arrange for interpretive services to assist at discharge as needed  - Refer to Case Management Department for coordinating discharge planning if the patient needs post-hospital services based on physician/advanced practitioner order or complex needs related to functional status, cognitive ability, or social support system  Outcome: Progressing     Problem: Knowledge Deficit  Goal: Patient/family/caregiver demonstrates understanding of disease process, treatment plan, medications, and discharge instructions  Description: Complete learning assessment and assess knowledge base.  Interventions:  - Provide teaching at level of understanding  - Provide teaching via preferred learning methods  Outcome: Progressing

## 2025-05-18 NOTE — ED PROVIDER NOTES
Time reflects when diagnosis was documented in both MDM as applicable and the Disposition within this note       Time User Action Codes Description Comment    5/18/2025  1:21 PM Nayana Colbert Add [I26.99] Pulmonary embolism (HCC)     5/18/2025  1:27 PM Nayana Colbert Add [N28.9] Lesion of right native kidney     5/18/2025  1:28 PM Nayana Colbert Add [I71.40] Abdominal aortic aneurysm (AAA) (HCC)           ED Disposition       ED Disposition   Admit    Condition   Stable    Date/Time   Sun May 18, 2025  1:33 PM    Comment   Case was discussed with LEE and the patient's admission status was agreed to be Admission Status: observation status to the service of Dr. Bowen .               Assessment & Plan       Medical Decision Making  79 year old male presents for evaluation of right sided flank pain which woke him at 4 am this morning.  No CVA tenderness on exam.  Patient denies radiation of pain into the abdomen; however, significant RUQ tenderness with positive Milner's sign.  LFTs and lipase WNL making choledocholithiasis unlikely.  CT without signs of cholecystitis; however, patient found to have pulmonary emboli in the right lower lung with possible pulmonary infarct.  VTE heparin ordered.  Patient informed of incidental aaa and renal lesion which will require further evaluation.  Patient admitted for further evaluation and management of PE.    Amount and/or Complexity of Data Reviewed  Labs: ordered.  Radiology: ordered.    Risk  Prescription drug management.  Decision regarding hospitalization.             Medications   heparin (VTE/PE) high (has no administration in time range)   ondansetron (ZOFRAN) injection 4 mg (4 mg Intravenous Given 5/18/25 1036)   sodium chloride 0.9 % bolus 1,000 mL (0 mL Intravenous Stopped 5/18/25 1222)   ketorolac (TORADOL) injection 15 mg (15 mg Intravenous Given 5/18/25 1105)   iohexol (OMNIPAQUE) 350 MG/ML injection (MULTI-DOSE) 100 mL (100 mL Intravenous Given  "5/18/25 1145)       ED Risk Strat Scores                    No data recorded                            History of Present Illness       Chief Complaint   Patient presents with    Flank Pain     Pt states \"I think I have another kidney stone. I woke up with left side back pain.\" Pt denies any current urinary complications.        Past Medical History:   Diagnosis Date    Aortic atherosclerosis (HCC)     Arthropathy of knee     Atrial flutter (HCC)     Benign enlargement of prostate     Coronary atherosclerosis of native coronary artery     Erectile dysfunction     Hypertension     Impaired fasting glucose     Mild cognitive disorder     Mild depression     Shoulder pain, bilateral       History reviewed. No pertinent surgical history.   History reviewed. No pertinent family history.   Social History[1]   E-Cigarette/Vaping      E-Cigarette/Vaping Substances      I have reviewed and agree with the history as documented.     79 year old male presents for evaluation of sharp right flank pain which woke him from sleep at 4 am this morning.  Pain does not radiate.  He states that the pain feels similar to when he had a kidney stone on the left side.  He reports mild nausea this morning with recent nausea, vomiting and diarrhea over the past 4 days.  Vomiting and diarrhea have since resolved.  He states he feels bloated; however, this has been present for a long while and resolves with defecation.  No fevers or chills.  No hematuria, dysuria or frequency.      Flank Pain      Review of Systems   Genitourinary:  Positive for flank pain.           Objective       ED Triage Vitals [05/18/25 0947]   Temperature Pulse Blood Pressure Respirations SpO2 Patient Position - Orthostatic VS   99.1 °F (37.3 °C) 85 144/66 20 98 % Sitting      Temp Source Heart Rate Source BP Location FiO2 (%) Pain Score    Temporal Monitor Left arm -- 8      Vitals      Date and Time Temp Pulse SpO2 Resp BP Pain Score FACES Pain Rating User   05/18/25 " 1220 -- 83 95 % 20 111/62 -- -- AM   05/18/25 1105 -- -- -- -- -- 8 -- AM   05/18/25 1100 -- 83 92 % 20 127/59 -- -- AM   05/18/25 0947 99.1 °F (37.3 °C) 85 98 % 20 144/66 8 --             Physical Exam  Vitals and nursing note reviewed.     Cardiovascular:      Rate and Rhythm: Normal rate and regular rhythm.      Pulses: Normal pulses.   Pulmonary:      Effort: Pulmonary effort is normal. No respiratory distress.   Abdominal:      General: There is distension.      Palpations: Abdomen is soft.      Tenderness: There is abdominal tenderness in the right upper quadrant. There is guarding. There is no right CVA tenderness, left CVA tenderness or rebound. Positive signs include Milner's sign.     Neurological:      Mental Status: He is alert.         Results Reviewed       Procedure Component Value Units Date/Time    HS Troponin 0hr (reflex protocol) [[id number]]     Lab Status: No result Specimen: Blood     B-Type Natriuretic Peptide(BNP) [[id number]]     Lab Status: No result Specimen: Blood     Protime-INR [[id number]]     Lab Status: No result Specimen: Blood     APTT [[id number]]     Lab Status: No result Specimen: Blood     Lactic acid, plasma (w/reflex if result > 2.0) [[id number]]     Lab Status: No result Specimen: Blood     Urine Microscopic [[id number]]  (Normal) Collected: 05/18/25 1224    Lab Status: Final result Specimen: Urine, Clean Catch Updated: 05/18/25 1303     RBC, UA None Seen /hpf      WBC, UA 0-1 /hpf      Epithelial Cells Occasional /hpf      Bacteria, UA None Seen /hpf     UA w Reflex to Microscopic w Reflex to Culture [[id number]]  (Abnormal) Collected: 05/18/25 1224    Lab Status: Final result Specimen: Urine, Clean Catch Updated: 05/18/25 1231     Color, UA Yellow     Clarity, UA Clear     Specific Gravity, UA <1.005     pH, UA 6.5     Leukocytes, UA Negative     Nitrite, UA Negative     Protein, UA 30 (1+) mg/dl      Glucose, UA Negative mg/dl      Ketones, UA 10 (1+) mg/dl      Urobilinogen,  UA <2.0 mg/dl      Bilirubin, UA Negative     Occult Blood, UA Negative    Comprehensive metabolic panel [[id number]] Collected: 05/18/25 1036    Lab Status: Final result Specimen: Blood from Arm, Left Updated: 05/18/25 1109     Sodium 137 mmol/L      Potassium 4.6 mmol/L      Chloride 106 mmol/L      CO2 25 mmol/L      ANION GAP 6 mmol/L      BUN 22 mg/dL      Creatinine 0.97 mg/dL      Glucose 110 mg/dL      Calcium 9.0 mg/dL      AST 17 U/L      ALT 12 U/L      Alkaline Phosphatase 58 U/L      Total Protein 6.6 g/dL      Albumin 4.0 g/dL      Total Bilirubin 0.73 mg/dL      eGFR 72 ml/min/1.73sq m     Narrative:      [employer] guidelines for Chronic Kidney Disease (CKD):     Stage 1 with normal or high GFR (GFR > 90 mL/min/1.73 square meters)    Stage 2 Mild CKD (GFR = 60-89 mL/min/1.73 square meters)    Stage 3A Moderate CKD (GFR = 45-59 mL/min/1.73 square meters)    Stage 3B Moderate CKD (GFR = 30-44 mL/min/1.73 square meters)    Stage 4 Severe CKD (GFR = 15-29 mL/min/1.73 square meters)    Stage 5 End Stage CKD (GFR <15 mL/min/1.73 square meters)  Note: GFR calculation is accurate only with a steady state creatinine    Lipase [[id number]]  (Normal) Collected: 05/18/25 1036    Lab Status: Final result Specimen: Blood from Arm, Left Updated: 05/18/25 1109     Lipase 19 u/L     CBC and differential [[id number]]  (Abnormal) Collected: 05/18/25 1036    Lab Status: Final result Specimen: Blood from Arm, Left Updated: 05/18/25 1050     WBC 9.28 Thousand/uL      RBC 4.53 Million/uL      Hemoglobin 13.9 g/dL      Hematocrit 42.6 %      MCV 94 fL      MCH 30.7 pg      MCHC 32.6 g/dL      RDW 13.5 %      MPV 9.8 fL      Platelets 177 Thousands/uL      nRBC 0 /100 WBCs      Segmented % 79 %      Immature Grans % 0 %      Lymphocytes % 7 %      Monocytes % 13 %      Eosinophils Relative 1 %      Basophils Relative 0 %      Absolute Neutrophils 7.33 Thousands/µL      Absolute Immature Grans 0.03  Thousand/uL      Absolute Lymphocytes 0.67 Thousands/µL      Absolute Monocytes 1.17 Thousand/µL      Eosinophils Absolute 0.06 Thousand/µL      Basophils Absolute 0.02 Thousands/µL             CT abdomen pelvis with contrast   Final Interpretation by Joshua Shaikh MD (05/18 1321)      Segmental and subsegmental right lower lobe pulmonary emboli. Groundglass density in the posterior right lung base probably represents atelectasis but developing early changes of pulmonary infarction not excluded.      No acute findings in the abdomen or pelvis.      Indeterminate 11 mm anterior interpolar right renal lesion which could represent complex cyst or solid renal mass. Recommendation is for further characterization with nonemergent follow-up renal ultrasound.      Prostatomegaly.      Infrarenal abdominal aortic aneurysm measuring up to 32 mm.         I personally discussed this study with NAYANA COLBERT on 5/18/2025 1:19 PM.               Workstation performed: [id number]             ECG 12 Lead Documentation Only    Date/Time: 5/18/2025 10:39 AM    Performed by: Naynaa Colbert MD  Authorized by: Nayana Colbert MD    Indications / Diagnosis:  Abdominal pain  ECG reviewed by me, the ED Provider: yes    Patient location:  ED  Previous ECG:     Previous ECG:  Compared to current    Comparison ECG info:  3/8/21 sinus rhythm with ateroseptal infarct    Similarity:  No change  Interpretation:     Interpretation: abnormal    Rate:     ECG rate:  85    ECG rate assessment: normal    Rhythm:     Rhythm: sinus rhythm    Ectopy:     Ectopy: none    QRS:     QRS axis:  Normal    QRS intervals:  Normal  Conduction:     Conduction: normal    ST segments:     ST segments:  Normal  T waves:     T waves: normal    Q waves:     Q waves:  V2 and V3      ED Medication and Procedure Management   None     Patient's Medications    No medications on file     No discharge procedures on file.  ED SEPSIS  DOCUMENTATION   Time reflects when diagnosis was documented in both MDM as applicable and the Disposition within this note       Time User Action Codes Description Comment    5/18/2025  1:21 PM Nayana Colbert [I26.99] Pulmonary embolism (HCC)     5/18/2025  1:27 PM Nayana Colbert [N28.9] Lesion of right native kidney     5/18/2025  1:28 PM Nayana Colbert [I71.40] Abdominal aortic aneurysm (AAA) (HCC)                      [1]   Social History  Tobacco Use    Smoking status: Never    Smokeless tobacco: Never   Substance Use Topics    Alcohol use: Yes    Drug use: Never        Nayana Colbert MD  05/18/25 0711

## 2025-05-18 NOTE — ASSESSMENT & PLAN NOTE
"/67   Pulse 73   Temp 98 °F (36.7 °C)   Resp 17   Ht 5' 5\" (1.651 m)   Wt 73.3 kg (161 lb 9.6 oz)   SpO2 92%   BMI 26.89 kg/m²     Deviously on lisinopril and amlodipine  Currently not on any antihypertensives  Blood pressure is acceptable  Monitor as per protocol  "

## 2025-05-18 NOTE — H&P
"H&P - Hospitalist   Name: Allan Chong 79 y.o. male I MRN: [id number]  Unit/Bed#: -01 I Date of Admission: 5/18/2025   Date of Service: 5/18/2025 I Hospital Day: 0     Assessment & Plan  Pulmonary emboli (HCC)  Presented with R flank pain and was found to have new pulmonary embolism  No recent travel  History of shoulder arthroplasty in early March of this year  Denies any chest pain    Comfortable on room air    CT chest-Segmental and subsegmental right lower lobe pulmonary emboli. Groundglass density in the posterior right lung base probably represents atelectasis but developing early changes of pulmonary infarction not excluded.   CT abdomen/pelvis-complex right renal lesion, concerning for complex cyst versus mass    No concern for right heart strain  Continue with telemetry  Started on IV heparin GTT, to be transition to DOAC in 24 hours  Echo ordered  Appreciate hematology recommendations  Follow-up renal ultrasound  Hypertension  /67   Pulse 73   Temp 98 °F (36.7 °C)   Resp 17   Ht 5' 5\" (1.651 m)   Wt 73.3 kg (161 lb 9.6 oz)   SpO2 92%   BMI 26.89 kg/m²     Deviously on lisinopril and amlodipine  Currently not on any antihypertensives  Blood pressure is acceptable  Monitor as per protocol  CAD (coronary artery disease)  Denies any chest pain  Continue with aspirin and statin  Mixed hyperlipidemia  On rosuvastatin 20 mg daily, substituted by atorvastatin 40 mg daily at bedtime  Benign prostatic hyperplasia with lower urinary tract symptoms  Urinary retention protocol  Flomax 0.4 mg daily  Anxiety  Continue with Lexapro 10 mg daily  AAA (abdominal aortic aneurysm) (HCC)  Per history  Recent sites consistent with prior images  Outpatient surveillance  Renal lesion  Denies any hematuria  CT abdomen/pelvis concerning forIndeterminate 11 mm anterior interpolar right renal lesion which could represent complex cyst or solid renal mass.   Right renal ultrasound ordered and pending      VTE " "Pharmacologic Prophylaxis:   Moderate Risk (Score 3-4) - Pharmacological DVT Prophylaxis Ordered: heparin drip.  Code Status: Level 1 - Full Code patient  Discussion with family: Updated  (wife) at bedside.    Anticipated Length of Stay: Patient will be admitted on an observation basis with an anticipated length of stay of less than 2 midnights secondary to pulmonary embolism .    History of Present Illness   Chief Complaint:   Chief Complaint   Patient presents with    Flank Pain     Pt states \"I think I have another kidney stone. I woke up with left side back pain.\" Pt denies any current urinary complications.          Allan Chong is a 79 y.o. male with a PMH of nephrolithiasis, hypertension, hyperlipidemia, CAD and anxiety presented with right flank/back pain.  Currently hemodynamically stable.  Is found to have pulmonary embolism with suspected pulmonary infarction.  Currently on heparin GGT.  On room air.  Confirm level 1 full CODE STATUS.  Will be admitted under [facility] for further management and care.    Review of Systems   Constitutional:  Positive for fatigue. Negative for chills and fever.   HENT:  Negative for ear pain and sore throat.    Eyes:  Negative for pain and visual disturbance.   Respiratory:  Negative for cough and shortness of breath.    Cardiovascular:  Negative for chest pain and palpitations.   Gastrointestinal:  Negative for abdominal pain and vomiting.   Genitourinary:  Negative for dysuria and hematuria.   Musculoskeletal:  Positive for back pain. Negative for arthralgias.   Skin:  Negative for color change and rash.   Neurological:  Negative for seizures and syncope.   Psychiatric/Behavioral:  Negative for agitation and confusion.    All other systems reviewed and are negative.      Historical Information   Past Medical History:   Diagnosis Date    Aortic atherosclerosis (HCC)     Arthropathy of knee     Atrial flutter (HCC)     Benign enlargement of prostate     " Coronary atherosclerosis of native coronary artery     Erectile dysfunction     Hypertension     Impaired fasting glucose     Mild cognitive disorder     Mild depression     Shoulder pain, bilateral      History reviewed. No pertinent surgical history.  Social History     Tobacco Use    Smoking status: Never    Smokeless tobacco: Never   Substance and Sexual Activity    Alcohol use: Not Currently    Drug use: Never    Sexual activity: Not on file     E-Cigarette/Vaping     E-Cigarette/Vaping Substances     Family History   Family history unknown: Yes     Social History:  Marital Status: /Civil Union     Meds/Allergies   I have reviewed home medications with patient personally.  Prior to Admission medications    Medication Sig Start Date End Date Taking? Authorizing Provider   aspirin (ECOTRIN LOW STRENGTH) 81 mg EC tablet Take 81 mg by mouth daily   Yes Historical Provider, MD   escitalopram (LEXAPRO) 10 mg tablet Take 10 mg by mouth daily   Yes Historical Provider, MD   rosuvastatin (CRESTOR) 20 MG tablet Take 20 mg by mouth daily   Yes Historical Provider, MD   tamsulosin (FLOMAX) 0.4 mg Take 0.4 mg by mouth daily   Yes Historical Provider, MD     Allergies   Allergen Reactions    Beta Adrenergic Blockers Other (See Comments)    Propranolol Other (See Comments)     Hypotension         Objective :  Temp:  [98 °F (36.7 °C)-99.1 °F (37.3 °C)] 98 °F (36.7 °C)  HR:  [73-85] 73  BP: (111-144)/(59-67) 134/67  Resp:  [17-20] 17  SpO2:  [92 %-98 %] 92 %  O2 Device: None (Room air)    Physical Exam  Vitals reviewed.   Constitutional:       Appearance: Normal appearance.   HENT:      Head: Atraumatic.      Mouth/Throat:      Mouth: Mucous membranes are dry.     Eyes:      General: No scleral icterus.      Cardiovascular:      Rate and Rhythm: Normal rate and regular rhythm.      Heart sounds: Normal heart sounds.   Pulmonary:      Effort: No respiratory distress.   Abdominal:      General: There is no distension.  "    Musculoskeletal:      Right lower leg: No edema.      Left lower leg: No edema.     Skin:     General: Skin is dry.     Neurological:      General: No focal deficit present.      Mental Status: He is alert. Mental status is at baseline.        Lines/Drains:            Lab Results: I have reviewed the following results:  Results from last 7 days   Lab Units 05/18/25  1036   WBC Thousand/uL 9.28   HEMOGLOBIN g/dL 13.9   HEMATOCRIT % 42.6   PLATELETS Thousands/uL 177   SEGS PCT % 79*   LYMPHO PCT % 7*   MONO PCT % 13*   EOS PCT % 1     Results from last 7 days   Lab Units 05/18/25  1036   SODIUM mmol/L 137   POTASSIUM mmol/L 4.6   CHLORIDE mmol/L 106   CO2 mmol/L 25   BUN mg/dL 22   CREATININE mg/dL 0.97   ANION GAP mmol/L 6   CALCIUM mg/dL 9.0   ALBUMIN g/dL 4.0   TOTAL BILIRUBIN mg/dL 0.73   ALK PHOS U/L 58   ALT U/L 12   AST U/L 17   GLUCOSE RANDOM mg/dL 110     Results from last 7 days   Lab Units 05/18/25  1342   INR  1.06         No results found for: \"HGBA1C\"  Results from last 7 days   Lab Units 05/18/25  1342   LACTIC ACID mmol/L 0.8       Imaging Results Review: I reviewed radiology reports from this admission including: CT chest and CT abdomen/pelvis.  Other Study Results Review: EKG was reviewed.     Administrative Statements   I have spent a total time of 65 minutes in caring for this patient on the day of the visit/encounter including Patient and family education, Importance of tx compliance, Documenting in the medical record, Reviewing/placing orders in the medical record (including tests, medications, and/or procedures), Obtaining or reviewing history  , and Communicating with other healthcare professionals .    ** Please Note: This note has been constructed using a voice recognition system. **    "

## 2025-05-19 ENCOUNTER — APPOINTMENT (OUTPATIENT)
Dept: NON INVASIVE DIAGNOSTICS | Facility: HOSPITAL | Age: 80
DRG: 175 | End: 2025-05-19
Payer: COMMERCIAL

## 2025-05-19 ENCOUNTER — APPOINTMENT (INPATIENT)
Dept: ULTRASOUND IMAGING | Facility: HOSPITAL | Age: 80
DRG: 175 | End: 2025-05-19
Payer: COMMERCIAL

## 2025-05-19 PROBLEM — R09.02 HYPOXIA: Status: ACTIVE | Noted: 2025-05-19

## 2025-05-19 LAB
ANION GAP SERPL CALCULATED.3IONS-SCNC: 5 MMOL/L (ref 4–13)
AORTIC ROOT: 3.8 CM
AORTIC VALVE MEAN VELOCITY: 11.2 M/S
APTT PPP: 66 SECONDS (ref 23–34)
APTT PPP: 98 SECONDS (ref 23–34)
ASCENDING AORTA: 3.8 CM
AV LVOT MEAN GRADIENT: 2 MMHG
AV LVOT PEAK GRADIENT: 4 MMHG
AV MEAN PRESS GRAD SYS DOP V1V2: 6 MMHG
AV PEAK GRADIENT: 12 MMHG
AV VELOCITY RATIO: 0.67
AV VMAX SYS DOP: 1.72 M/S
BASOPHILS # BLD AUTO: 0.02 THOUSANDS/ÂΜL (ref 0–0.1)
BASOPHILS NFR BLD AUTO: 0 % (ref 0–1)
BSA FOR ECHO PROCEDURE: 1.81 M2
BUN SERPL-MCNC: 21 MG/DL (ref 5–25)
CALCIUM SERPL-MCNC: 8.1 MG/DL (ref 8.4–10.2)
CHLORIDE SERPL-SCNC: 109 MMOL/L (ref 96–108)
CO2 SERPL-SCNC: 22 MMOL/L (ref 21–32)
CREAT SERPL-MCNC: 0.89 MG/DL (ref 0.6–1.3)
DOP CALC AO VTI: 28.93 CM
DOP CALC LVOT PEAK VEL VTI: 19.33 CM
DOP CALC LVOT PEAK VEL: 1.04 M/S
DOP CALC MV VTI: 26.52 CM
E WAVE DECELERATION TIME: 176 MS
E/A RATIO: 0.88
EOSINOPHIL # BLD AUTO: 0.02 THOUSAND/ÂΜL (ref 0–0.61)
EOSINOPHIL NFR BLD AUTO: 0 % (ref 0–6)
ERYTHROCYTE [DISTWIDTH] IN BLOOD BY AUTOMATED COUNT: 13.5 % (ref 11.6–15.1)
GFR SERPL CREATININE-BSD FRML MDRD: 80 ML/MIN/1.73SQ M
GLUCOSE P FAST SERPL-MCNC: 117 MG/DL (ref 65–99)
GLUCOSE SERPL-MCNC: 117 MG/DL (ref 65–140)
HCT VFR BLD AUTO: 36.2 % (ref 36.5–49.3)
HGB BLD-MCNC: 11.9 G/DL (ref 12–17)
IMM GRANULOCYTES # BLD AUTO: 0.07 THOUSAND/UL (ref 0–0.2)
IMM GRANULOCYTES NFR BLD AUTO: 1 % (ref 0–2)
LAAS-AP2: 19.4 CM2
LAAS-AP4: 20.2 CM2
LEFT ATRIUM SIZE: 3.4 CM
LEFT ATRIUM VOLUME (MOD BIPLANE): 57 ML
LEFT ATRIUM VOLUME INDEX (MOD BIPLANE): 31.5 ML/M2
LYMPHOCYTES # BLD AUTO: 0.79 THOUSANDS/ÂΜL (ref 0.6–4.47)
LYMPHOCYTES NFR BLD AUTO: 7 % (ref 14–44)
MAGNESIUM SERPL-MCNC: 1.8 MG/DL (ref 1.9–2.7)
MCH RBC QN AUTO: 30.8 PG (ref 26.8–34.3)
MCHC RBC AUTO-ENTMCNC: 32.9 G/DL (ref 31.4–37.4)
MCV RBC AUTO: 94 FL (ref 82–98)
MONOCYTES # BLD AUTO: 1.33 THOUSAND/ÂΜL (ref 0.17–1.22)
MONOCYTES NFR BLD AUTO: 12 % (ref 4–12)
MV E'TISSUE VEL-LAT: 9 CM/S
MV E'TISSUE VEL-SEP: 10 CM/S
MV MEAN GRADIENT: 2 MMHG
MV PEAK A VEL: 1.03 M/S
MV PEAK E VEL: 91 CM/S
MV PEAK GRADIENT: 5 MMHG
MV STENOSIS PRESSURE HALF TIME: 51 MS
MV VALVE AREA P 1/2 METHOD: 4.31
NEUTROPHILS # BLD AUTO: 9.35 THOUSANDS/ÂΜL (ref 1.85–7.62)
NEUTS SEG NFR BLD AUTO: 80 % (ref 43–75)
NRBC BLD AUTO-RTO: 0 /100 WBCS
PLATELET # BLD AUTO: 157 THOUSANDS/UL (ref 149–390)
PMV BLD AUTO: 10.4 FL (ref 8.9–12.7)
POTASSIUM SERPL-SCNC: 3.8 MMOL/L (ref 3.5–5.3)
RA PRESSURE ESTIMATED: 3 MMHG
RBC # BLD AUTO: 3.86 MILLION/UL (ref 3.88–5.62)
RIGHT ATRIAL 2D VOLUME: 45 ML
RIGHT ATRIUM AREA SYSTOLE A4C: 17.4 CM2
RIGHT VENTRICLE ID DIMENSION: 3.9 CM
RV PSP: 39 MMHG
SL CV LEFT ATRIUM LENGTH A2C: 5.4 CM
SL CV LV EF: 50
SODIUM SERPL-SCNC: 136 MMOL/L (ref 135–147)
TR MAX PG: 36 MMHG
TR PEAK VELOCITY: 3 M/S
TRANSVERSE AORTIC ARCH: 3.38 CM
TRICUSPID ANNULAR PLANE SYSTOLIC EXCURSION: 2.4 CM
TRICUSPID VALVE PEAK REGURGITATION VELOCITY: 3.02 M/S
WBC # BLD AUTO: 11.58 THOUSAND/UL (ref 4.31–10.16)

## 2025-05-19 PROCEDURE — 76775 US EXAM ABDO BACK WALL LIM: CPT

## 2025-05-19 PROCEDURE — 99233 SBSQ HOSP IP/OBS HIGH 50: CPT | Performed by: PHYSICIAN ASSISTANT

## 2025-05-19 PROCEDURE — 85730 THROMBOPLASTIN TIME PARTIAL: CPT | Performed by: INTERNAL MEDICINE

## 2025-05-19 PROCEDURE — 80048 BASIC METABOLIC PNL TOTAL CA: CPT | Performed by: INTERNAL MEDICINE

## 2025-05-19 PROCEDURE — 93306 TTE W/DOPPLER COMPLETE: CPT | Performed by: INTERNAL MEDICINE

## 2025-05-19 PROCEDURE — C8929 TTE W OR WO FOL WCON,DOPPLER: HCPCS

## 2025-05-19 PROCEDURE — 87040 BLOOD CULTURE FOR BACTERIA: CPT | Performed by: PHYSICIAN ASSISTANT

## 2025-05-19 PROCEDURE — 83735 ASSAY OF MAGNESIUM: CPT | Performed by: INTERNAL MEDICINE

## 2025-05-19 PROCEDURE — 85025 COMPLETE CBC W/AUTO DIFF WBC: CPT | Performed by: INTERNAL MEDICINE

## 2025-05-19 RX ORDER — TIZANIDINE 2 MG/1
2 TABLET ORAL EVERY 8 HOURS PRN
Status: DISCONTINUED | OUTPATIENT
Start: 2025-05-19 | End: 2025-05-21 | Stop reason: HOSPADM

## 2025-05-19 RX ADMIN — DOCUSATE SODIUM 100 MG: 100 CAPSULE, LIQUID FILLED ORAL at 19:28

## 2025-05-19 RX ADMIN — ACETAMINOPHEN 650 MG: 325 TABLET, FILM COATED ORAL at 16:48

## 2025-05-19 RX ADMIN — TIZANIDINE 2 MG: 2 TABLET ORAL at 05:57

## 2025-05-19 RX ADMIN — ACETAMINOPHEN 650 MG: 325 TABLET, FILM COATED ORAL at 03:43

## 2025-05-19 RX ADMIN — ATORVASTATIN CALCIUM 40 MG: 40 TABLET, FILM COATED ORAL at 16:48

## 2025-05-19 RX ADMIN — ESCITALOPRAM OXALATE 10 MG: 10 TABLET ORAL at 09:27

## 2025-05-19 RX ADMIN — ASPIRIN 81 MG CHEWABLE TABLET 81 MG: 81 TABLET CHEWABLE at 09:27

## 2025-05-19 RX ADMIN — TAMSULOSIN HYDROCHLORIDE 0.4 MG: 0.4 CAPSULE ORAL at 16:48

## 2025-05-19 RX ADMIN — PERFLUTREN 0.8 ML/MIN: 6.52 INJECTION, SUSPENSION INTRAVENOUS at 12:55

## 2025-05-19 RX ADMIN — APIXABAN 10 MG: 5 TABLET, FILM COATED ORAL at 19:28

## 2025-05-19 RX ADMIN — HEPARIN SODIUM 13 UNITS/KG/HR: 10000 INJECTION, SOLUTION INTRAVENOUS at 12:10

## 2025-05-19 NOTE — UTILIZATION REVIEW
"Initial Clinical Review    Admission: Date/Time/Statement: 5/18/25 1333 observation and CHANGED  5/19/25 1453 TO INPATIENT DUE TO NEED FOR > 2 MIDNIGHT STAY FOR PE WITH TRANSITION TO DOAC,  OXIMETRY WITH POSSIBLE NEED OXYGEN, SATS DOWN TO 90% ROOM AIR AND ONGOING PAIN CONTROL.    Admission Orders (From admission, onward)       Ordered        05/19/25 1453  INPATIENT ADMISSION  Once            05/18/25 1333  Place in Observation  Once                          Orders Placed This Encounter   Procedures    INPATIENT ADMISSION     Standing Status:   Standing     Number of Occurrences:   1     Level of Care:   Med Surg [16]     Estimated length of stay:   More than 2 Midnights     Certification:   I certify that inpatient services are medically necessary for this patient for a duration of greater than two midnights. See H&P and MD Progress Notes for additional information about the patient's course of treatment.     ED Arrival Information       Expected   -    Arrival   5/18/2025 09:42    Acuity   Urgent              Means of arrival   Walk-In    Escorted by   Spouse    Service   Hospitalist    Admission type   Emergency              Arrival complaint   Back pain             Chief Complaint   Patient presents with    Flank Pain     Pt states \"I think I have another kidney stone. I woke up with left side back pain.\" Pt denies any current urinary complications.        Initial Presentation: 79 y.o. male  to ED via walk in from home.    Admitted to observation AND CONVERTED TO INPATIENT with Dx: Pulmonary Embolism.  Presented to ED with right flank and back pain starting the morning of arrival upon awakening about 0400.  Feels similar to previous kidney stone.   Over last 4 days with nausea, vomiting and diarrhea,  today with nausea.   PMHx:nephrolithiasis, hypertension, hyperlipidemia, CAD and anxiety . On exam: abdominal distention.  Abdominal tenderness in RUQ. Guarding.  Milner sign.   Imaging shows RLL PE. ED treatment:  " IVF bolus, Zofran and Toradol.    Plan includes:  telemetry.  Start IV Heparin drip.  Echo. Consult Heme.  Check renal US.  Monitor BP.  Continue home asa and statin.      Anticipated Length of Stay/Certification Statement:  Patient will be admitted on an observation basis with an anticipated length of stay of less than 2 midnights secondary to pulmonary embolism     Date: 5/19/25   Day 2 CHANGED TO INPATIENT:  has cental chest pain radiating between shoulder blades.  Has recurrence of right flank pain, constant and dull.  Hypotensive this am, not lightheaded.  Overnight febrile.  On exam sat 90% room air, does not appear in distress.   Telemetry - sinus.  Wbc 11.58.  H&H 11.9/36.2.   continue telemetry.   Heparin drip with transition to Eliquis.  Pain control.   Pulse ox with ambulation.  Keep sats > 92%, may need home oxygen.  Echo pending.   Certification Statement: The patient will continue to require additional inpatient hospital stay due to chest pain and pending echo and transition to OAC     Patient has crossed 3 midnights and requires ongoing care    5/20/2025 .  Patient presents with  intermittent cough  On exam non focal.  Telemetry NSR.    Abnormal labs or imaging:  wbc 15.31.   procal 0.59  Diagnosis/Plan    pulmonary embolism/increased oxygen demand/SIRS.  Continue Eliquis.  Oximetry - requiring 1 to 2 liters of oxygen.  Evaluate for home oxygen.  Home asa on hold.  Monitor BP. Started ceftriaxone and follow cultures, procal    ED Treatment-Medication Administration from 05/18/2025 0942 to 05/18/2025 1355         Date/Time Order Dose Route Action     05/18/2025 1036 ondansetron (ZOFRAN) injection 4 mg 4 mg Intravenous Given     05/18/2025 1035 sodium chloride 0.9 % bolus 1,000 mL 1,000 mL Intravenous New Bag     05/18/2025 1105 ketorolac (TORADOL) injection 15 mg 15 mg Intravenous Given            Scheduled Medications:  aspirin, 81 mg, Oral, Daily - DC 1429 5/19  atorvastatin, 40 mg, Oral, Daily With  Dinner  docusate sodium, 100 mg, Oral, BID  escitalopram, 10 mg, Oral, Daily  tamsulosin, 0.4 mg, Oral, Daily With Dinner    apixaban (ELIQUIS) tablet 10 mg  Dose: 10 mg  Freq: 2 times daily Route: PO  Start: 05/19/25 1800 End: 05/26/25 0859   heparin (porcine) injection 5,600 Units  Dose: 5,600 Units  Freq: Once Route: IV  Start: 05/18/25 1400 End: 05/18/25 1438    cefTRIAXone (ROCEPHIN) IVPB (premix in dextrose) 1,000 mg 50 mL  Dose: 1,000 mg  Freq: Every 24 hours Route: IV  Last Dose: 1,000 mg (05/20/25 0951)  Start: 05/20/25 0930    Continuous IV Infusions:  heparin (porcine), 3-30 Units/kg/hr (Order-Specific), Intravenous, Titrated - DC 1600 5/19    PRN Meds:  acetaminophen, 650 mg, Oral, Q4H PRN X 1 5/18.  X 2 5/19.  X 1 5/20  heparin (porcine), 2,800 Units, Intravenous, Q6H PRN  heparin (porcine), 5,600 Units, Intravenous, Q6H PRN  ondansetron, 4 mg, Intravenous, Q6H PRN  simethicone, 80 mg, Oral, 4x Daily PRN  tiZANidine, 2 mg, Oral, Q8H PRN x 1 5/19    ED Triage Vitals [05/18/25 0947]   Temperature Pulse Respirations Blood Pressure SpO2 Pain Score   99.1 °F (37.3 °C) 85 20 144/66 98 % 8     Weight (last 2 days)       Date/Time Weight    05/20/25 0300 73.7 (162.4)    05/19/25 1003 73.9 (163)    05/19/25 04:56:21 74 (163.2)    05/18/25 1410 73.3 (161.6)    05/18/25 1328 73.6 (162.26)          Vital Signs (last 3 days)       Date/Time Temp Pulse Resp BP MAP (mmHg) SpO2 Calculated FIO2 (%) - Nasal Cannula Nasal Cannula O2 Flow Rate (L/min) O2 Device Patient Position - Orthostatic VS Pain    05/20/25 09:22:16 -- 65 -- 122/61 81 93 % -- -- -- -- --    05/20/25 0757 -- -- -- -- -- -- -- -- -- -- 4    05/20/25 07:10:59 98.2 °F (36.8 °C) 67 18 99/45 63 92 % 28 2 L/min Nasal cannula Lying --    05/20/25 0350 -- -- -- -- -- -- -- -- -- -- 8    05/20/25 03:41:55 97.9 °F (36.6 °C) 66 20 138/54 82 91 % -- -- -- -- --    05/19/25 2000 -- -- -- -- -- -- -- -- None (Room air) -- No Pain    05/19/25 19:13:03 97.9 °F (36.6  °C) 66 18 100/52 68 91 % -- -- None (Room air) Lying --    05/19/25 1622 -- -- -- -- -- -- -- -- -- -- No Pain    05/19/25 16:18:34 -- 66 -- 126/56 79 91 % -- -- -- -- --    05/19/25 16:15:36 101.1 °F (38.4 °C) 88 22 -- -- 89 % -- -- None (Room air) -- --    05/19/25 12:41:48 -- 77 -- 105/60 75 90 % -- -- -- -- --    05/19/25 11:56:14 -- 85 -- 123/61 82 93 % -- -- -- -- --    05/19/25 1003 -- 73 -- 103/50 -- -- -- -- -- -- --    05/19/25 0740 -- 60 -- 91/50 64 92 % -- -- -- -- 4    05/19/25 07:37:57 -- 60 -- 96/50 65 94 % -- -- -- -- --    05/19/25 07:24:20 98.2 °F (36.8 °C) 60 18 88/49 62 93 % 24 1 L/min Nasal cannula Lying --    05/19/25 0609 -- -- -- -- -- 94 % 24 1 L/min Nasal cannula -- --    05/19/25 05:41:24 98.4 °F (36.9 °C) 69 -- -- -- 91 % -- -- -- -- --    05/19/25 04:56:21 100.5 °F (38.1 °C)  72 19 103/50 68 90 % -- -- None (Room air) Lying --    05/19/25 0343 -- -- -- -- -- -- -- -- -- -- 8    05/18/25 2014 -- -- -- -- -- -- -- -- -- -- 7    05/18/25 19:56:29 100.7 °F (38.2 °C)  78 18 132/56 81 90 % -- -- None (Room air) Lying --    05/18/25 1949 -- -- -- -- -- 91 % -- -- None (Room air) -- No Pain    05/18/25 16:10:58 97.9 °F (36.6 °C) 76 21 124/99 107 94 % -- -- -- -- --    05/18/25 1421 -- -- -- -- -- -- -- -- None (Room air) -- --    05/18/25 1411 -- -- -- -- -- -- -- -- -- -- No Pain    05/18/25 13:59:40 98 °F (36.7 °C) 73 17 134/67 89 92 % -- -- -- -- --    05/18/25 1345 -- 82 20 121/62 -- 94 % -- -- None (Room air) -- 2    05/18/25 1220 -- 83 20 111/62 81 95 % -- -- None (Room air) -- --    05/18/25 1109 -- -- -- -- -- -- -- -- None (Room air) -- --    05/18/25 1105 -- -- -- -- -- -- -- -- -- -- 8    05/18/25 1100 -- 83 20 127/59 85 92 % -- -- None (Room air) -- --    05/18/25 0947 99.1 °F (37.3 °C) 85 20 144/66 95 98 % -- -- None (Room air) Sitting 8          Pertinent Labs/Diagnostic Test Results:   Radiology:  CTA chest pe study   Final Interpretation by Saad Bowman MD (05/20 1037)       1.  Segmental and subsegmental pulmonary emboli within the right lower lobe and right middle lobe. No evidence of right heart strain.   2.  Increasing groundglass and consolidative opacification of the right lower lobe is suspicious for pneumonia. A developing pulmonary infarction particularly within the lateral basal segment of the right lower lobe is possible.   3.  Volume loss of the right middle and left lower lobes, with superimposed infection possible in the right middle and left lower lobe as well.   4.  Mild interlobular septal thickening within the lung bases suggest a component of edema.   5.  6 mm right lower lobe pulmonary nodule. Recommend a follow-up chest CT in 6 months.      I personally discussed this study with FLACA PANDEY on 5/20/2025 10:28 AM.            Workstation performed: [id number]         US kidney and bladder   Final Interpretation by Ivan Darby MD (05/20 0641)      1 cm right renal anterior interpolar indeterminate nodule corresponds to the finding on the recent CT study. This likely represents chronic hemorrhagic or proteinaceous cyst. Renal ultrasound follow-up in 6 months is advised.      Bilateral renal simple cysts.      Prostatomegaly.      This study demonstrates a finding requiring imaging follow-up and was documented as such in Epic.               Workstation performed: [id number]         CT abdomen pelvis with contrast   Final Interpretation by Joshua Shaikh MD (05/18 1323)      Segmental and subsegmental right lower lobe pulmonary emboli. Groundglass density in the posterior right lung base probably represents atelectasis but developing early changes of pulmonary infarction not excluded.      No acute findings in the abdomen or pelvis.      Indeterminate 11 mm anterior interpolar right renal lesion which could represent complex cyst or solid renal mass. Recommendation is for further characterization with nonemergent follow-up renal ultrasound.       Prostatomegaly.      Infrarenal abdominal aortic aneurysm measuring up to 32 mm.         I personally discussed this study with ANGELIC LOW on 5/18/2025 1:19 PM.               Workstation performed: [id number]           Cardiology:  ECG 12 lead   Final Result by Brad Martinez MD (05/18 2047)   ** Poor data quality, interpretation may be adversely affected   Normal sinus rhythm   Low voltage QRS   Cannot rule out Anteroseptal infarct (cited on or before 15-Alonzo-2014)   Baseline Artifact   Abnormal ECG   When compared with ECG of 08-Mar-2021 22:10,   No significant change was found   Confirmed by Brad Martinez ([id number]) on 5/18/2025 8:47:15 PM        5/19/25 echo -  Left Ventricle: Left ventricular cavity size is normal. Wall thickness is not well visualized. The left ventricular ejection fraction is 50%. Systolic function is mildly reduced. Diastolic function is mildly abnormal, consistent with grade I (abnormal) relaxation.    The following segments are hypokinetic: apical septal and apex.    All other segments are normal.    Right Ventricle: Right ventricular cavity size is normal. Systolic function is normal.    Aortic Valve: There is aortic valve sclerosis.    Aorta: The aortic root is normal in size. The ascending aorta is mildly dilated. The aortic root is 3.80 cm. The ascending aorta is 3.8 cm.    GI:  No orders to display     Results from last 7 days   Lab Units 05/20/25  0905   SARS-COV-2  Negative     Results from last 7 days   Lab Units 05/20/25  0349 05/19/25  0456 05/18/25  1036   WBC Thousand/uL 15.31* 11.58* 9.28   HEMOGLOBIN g/dL 13.1 11.9* 13.9   HEMATOCRIT % 40.1 36.2* 42.6   PLATELETS Thousands/uL 176 157 177   TOTAL NEUT ABS Thousands/µL 12.58* 9.35* 7.33     Results from last 7 days   Lab Units 05/20/25  0804 05/19/25  0456 05/18/25  1036   SODIUM mmol/L 137 136 137   POTASSIUM mmol/L 4.1 3.8 4.6   CHLORIDE mmol/L 106 109* 106   CO2 mmol/L 21 22 25   ANION GAP mmol/L 10 5 6   BUN mg/dL  24 21 22   CREATININE mg/dL 0.98 0.89 0.97   EGFR ml/min/1.73sq m 72 80 72   CALCIUM mg/dL 8.5 8.1* 9.0   MAGNESIUM mg/dL 1.9 1.8*  --      Results from last 7 days   Lab Units 05/18/25  1036   AST U/L 17   ALT U/L 12   ALK PHOS U/L 58   TOTAL PROTEIN g/dL 6.6   ALBUMIN g/dL 4.0   TOTAL BILIRUBIN mg/dL 0.73     Results from last 7 days   Lab Units 05/20/25  0804 05/19/25  0456 05/18/25  1036   GLUCOSE RANDOM mg/dL 85 117 110     Results from last 7 days   Lab Units 05/18/25  1036   HEMOGLOBIN A1C % 5.6   EAG mg/dl 114     Results from last 7 days   Lab Units 05/18/25  1638 05/18/25  1342   HS TNI 0HR ng/L  --  11   HS TNI 2HR ng/L 14  --    HSTNI D2 ng/L 3  --      Results from last 7 days   Lab Units 05/19/25  1239 05/19/25  0456 05/18/25  2057 05/18/25  1342   PROTIME seconds  --   --   --  14.4   INR   --   --   --  1.06   PTT seconds 66* 98* 158* 29     Results from last 7 days   Lab Units 05/20/25  0804   PROCALCITONIN ng/ml 0.59*     Results from last 7 days   Lab Units 05/18/25  1342   LACTIC ACID mmol/L 0.8     Results from last 7 days   Lab Units 05/18/25  1342   BNP pg/mL 71     Results from last 7 days   Lab Units 05/18/25  1036   LIPASE u/L 19     Results from last 7 days   Lab Units 05/18/25  1224   CLARITY UA  Clear   COLOR UA  Yellow   SPEC GRAV UA  <1.005*   PH UA  6.5   GLUCOSE UA mg/dl Negative   KETONES UA mg/dl 10 (1+)*   BLOOD UA  Negative   PROTEIN UA mg/dl 30 (1+)*   NITRITE UA  Negative   BILIRUBIN UA  Negative   UROBILINOGEN UA (BE) mg/dl <2.0   LEUKOCYTES UA  Negative   WBC UA /hpf 0-1   RBC UA /hpf None Seen   BACTERIA UA /hpf None Seen   EPITHELIAL CELLS WET PREP /hpf Occasional     Results from last 7 days   Lab Units 05/20/25  0905   INFLUENZA A PCR  Negative   INFLUENZA B PCR  Negative   RSV PCR  Negative     Results from last 7 days   Lab Units 05/19/25  1709   BLOOD CULTURE  Received in Microbiology Lab. Culture in Progress.  Received in Microbiology Lab. Culture in Progress.        Past Medical History:   Diagnosis Date    Aortic atherosclerosis (HCC)     Arthropathy of knee     Atrial flutter (HCC)     Benign enlargement of prostate     Coronary atherosclerosis of native coronary artery     Erectile dysfunction     Hypertension     Impaired fasting glucose     Mild cognitive disorder     Mild depression     Shoulder pain, bilateral      Present on Admission:   Pulmonary emboli (HCC)   Hypertension   Benign prostatic hyperplasia with lower urinary tract symptoms   Renal lesion      Admitting Diagnosis: Back pain [M54.9]  Pulmonary embolism (HCC) [I26.99]  Flank pain [R10.9]  Lesion of right native kidney [N28.9]  Abdominal aortic aneurysm (AAA) (HCC) [I71.40]  Age/Sex: 79 y.o. male    Network Utilization Review Department  ATTENTION: Please call with any questions or concerns to [phone] and carefully listen to the prompts so that you are directed to the right person. All voicemails are confidential.   For Discharge needs, contact Care Management DC Support Team at [phone] opt. [phone]  Send all requests for admission clinical reviews, approved or denied determinations and any other requests to dedicated fax number below belonging to the campus where the patient is receiving treatment. List of dedicated fax numbers for the Facilities:  FACILITY NAME UR FAX NUMBER   ADMISSION DENIALS (Administrative/Medical Necessity) [phone]   DISCHARGE SUPPORT TEAM (NETWORK) [phone]   PARENT CHILD HEALTH (Maternity/NICU/Pediatrics) [phone]   [facility] [phone]   [facility] [phone]   [facility] [phone]   [facility] [phone]   [facility] [phone]   [facility] [phone]   [facility] [phone]   [facility] [phone]   [facility]  [facility] [phone]   [facility] [phone]   [facility] [phone]   [facility] [phone]

## 2025-05-19 NOTE — OCCUPATIONAL THERAPY NOTE
Occupational Therapy Screen Note     Patient Name: Allan Chong  Today's Date: 5/19/2025  Problem List  Principal Problem:    Pulmonary emboli (HCC)  Active Problems:    Hypertension    CAD (coronary artery disease)    Mixed hyperlipidemia    Benign prostatic hyperplasia with lower urinary tract symptoms    Anxiety    AAA (abdominal aortic aneurysm) (HCC)    Renal lesion              05/19/25 8543   OT Last Visit   OT Visit Date 05/19/25   Note Type   Note type Screen   Additional Comments OT orders received, chart reviewed. Pt documented with 24 [facility] scores & HLM achieved of 7. Pt documented as IND on board in room. Spoke with pt directly who reports he has been taking self <> BR independently. Pt states he has no concerns for ADL performance or functional mobility. No acute OT needs indicated, DC OT orders.       Cecilia Marshall, OTR/L

## 2025-05-19 NOTE — PHYSICAL THERAPY NOTE
PHYSICAL THERAPY SCREEN NOTE      Patient Name: Allan Chong  Today's Date: 5/19/2025 05/19/25 1317   PT Last Visit   PT Visit Date 05/19/25   Note Type   Note type Screen   Additional Comments PT orders received and chart review completed. Pt has a documented AMPAC score of 24 and achieved HLM of 7. Pt's white board in room documents pt as independent. Pt states he is ambulating to and from the bathroom independently and denies mobility concerns. At this time, pt has no acute PT needs, will D/C PT orders. Please re-consult if pt has a decline in status or concerns arise.     Rufus Edgar, PT

## 2025-05-19 NOTE — ASSESSMENT & PLAN NOTE
Presented with R flank pain and was found to have new pulmonary embolism  No recent travel  History of shoulder arthroplasty in early March of this year  CT chest-Segmental and subsegmental right lower lobe pulmonary emboli. Groundglass density in the posterior right lung base probably represents atelectasis but developing early changes of pulmonary infarction not excluded.   CT abdomen/pelvis-complex right renal lesion, concerning for complex cyst versus mass  Continue with telemetry x24h  Started on IV heparin GTT, transition to Eliquis today. Price check performed and pt agreeable to copay  Echo pending  Curbside discussion with Heme, can consider PE provoked and pt is appropriate for transition to DOAC today   No indication for hypercoagulability workup  Pain control, monitor hemodynamics and ensure stable Echo  Suspect pt may require home O2 as of now, satting 90 on RA, maintain sats above 92%  Check pulse ox with ambulation

## 2025-05-19 NOTE — ASSESSMENT & PLAN NOTE
Per history  Measuring 3.2 cm on CTA, stable from 3 cm measurement on 2023 CT from [facility]  Outpatient surveillance

## 2025-05-19 NOTE — CASE MANAGEMENT
Case Management Assessment & Discharge Planning Note    Patient name Allan Chong  Location /-01 MRN [id number]  : 1945 Date 2025       Current Admission Date: 2025  Current Admission Diagnosis:Pulmonary emboli (HCC)   Patient Active Problem List    Diagnosis Date Noted    Hypoxia 2025    Pulmonary emboli (HCC) 2025    Hypertension 2025    CAD (coronary artery disease) 2025    Mixed hyperlipidemia 2025    Benign prostatic hyperplasia with lower urinary tract symptoms 2025    Anxiety 2025    AAA (abdominal aortic aneurysm) (HCC) 2025    Renal lesion 2025      LOS (days): 0  Geometric Mean LOS (GMLOS) (days):   Days to GMLOS:     OBJECTIVE:    Risk of Unplanned Readmission Score: 10.56         Current admission status: Inpatient       Preferred Pharmacy:   [facility] [address] [address]  Phone: [phone] Fax: [phone]    Primary Care Provider: Jannet Oliva MD    Primary Insurance: BLUE CROSS [employer] REP  Secondary Insurance:     ASSESSMENT:  Active Health Care Proxies       Naomie Chong Health Care Representative - Spouse   Primary Phone: [phone] (Home)                 Advance Directives  Does patient have a Health Care POA?: Yes  Does patient have Advance Directives?: Yes  Advance Directives: Power of  for health care  Primary Contact: Daughter Lourdes.         Readmission Root Cause  30 Day Readmission: No    Patient Information  Admitted from:: Home  Mental Status: Alert  During Assessment patient was accompanied by: Spouse, Daughter  Assessment information provided by:: Spouse, Daughter  Primary Caregiver: Self  Support Systems: Self, Spouse/significant other, Children  County of Residence: [place]  What city do you live in?: [place]  Home entry access options. Select all that apply.: No steps to enter home  Type of Current  Residence: [address] [place] home  Upon entering residence, is there a bedroom on the main floor (no further steps)?: Yes  Upon entering residence, is there a bathroom on the main floor (no further steps)?: Yes  Living Arrangements: Lives w/ Spouse/significant other  Is patient a ?: No    Activities of Daily Living Prior to Admission  Functional Status: Independent  Completes ADLs independently?: Yes  Ambulates independently?: Yes  Does patient use assisted devices?: Yes  Assisted Devices (DME) used: Walker  Does patient currently own DME?: Yes  What DME does the patient currently own?: Walker  Does patient have a history of Outpatient Therapy (PT/OT)?: Yes (Current with O[facility].)  Does the patient have a history of Short-Term Rehab?: No  Does patient have a history of HHC?: No  Does patient currently have HHC?: No         Patient Information Continued  Income Source: SSI/SSD  Does patient have prescription coverage?: Yes  Can the patient afford their medications and any related supplies (such as glucometers or test strips)?: Yes  Does patient receive dialysis treatments?: No  Does patient have a history of substance abuse?: No  Does patient have a history of Mental Health Diagnosis?: No         Means of Transportation  Means of Transport to Appts:: Drives Self          DISCHARGE DETAILS:    Discharge planning discussed with:: Pt, spouse, and daughter.  Freedom of Choice: Yes     CM contacted family/caregiver?: Yes  Were Treatment Team discharge recommendations reviewed with patient/caregiver?: Yes  Did patient/caregiver verbalize understanding of patient care needs?: Yes  Were patient/caregiver advised of the risks associated with not following Treatment Team discharge recommendations?: Yes    Contacts  Patient Contacts: Nikkie, family.  Relationship to Patient:: Family  Contact Method: In Person  Reason/Outcome: Discharge Planning, Emergency Contact    Requested Home Health Care         Is the patient  interested in HHC at discharge?: No    DME Referral Provided  Referral made for DME?: No    Other Referral/Resources/Interventions Provided:  Interventions: Prescription Price Check  Referral Comments: Eliquis is $180.45. Agreeable to inital copay cost. Provide Medicare Part D brochure explaining copay/deductible. Provided 30-day free coupon for eliquis.         Treatment Team Recommendation: Home  Discharge Destination Plan:: Home                                         Additional Comments: CM met with pt, spouse, and daughter at bedside to plan discharge. Lives with spouse in a 2sh, 0ste, 1st fl setup. Reports being independent with walking and ADLs PTA. Uses and owns walker. Current with OPPT. No STR, HHC, inpatient psych, or D&A treatment. Has a medical POA. [facility]. [facility].

## 2025-05-19 NOTE — NURSING NOTE
Ambulated patient into the hallway on room air. Patient pulse ox read: 89-91%   Patient tolerated walk but feels cold and tired. Patient now also has fever of 101.1 orally. Notified attending.

## 2025-05-19 NOTE — ASSESSMENT & PLAN NOTE
Satting 90% on RA, will place on 1-2L NC to maintain sats of 92% or greater  2/2 PE  Home O2 eval  Treat primary problem

## 2025-05-19 NOTE — QUICK NOTE
Pt with fever spike to 101.1 (did have a temp overnight as well)m, also tachypneic so technically meets SIRS.  Given slight uptrend in WBCs and episode of hypotension earlier this morning accompanied by general malaise and weakness, will obtain blood cultures x 2.  UA on admission was bland and imaging of chest did not reveal any infiltrate.  No LUTS reported, no cough or congestion noted today.  Continue to monitor off antibiotics as fever/tachypnea can most likely be attributed to PE however if fever persists or hypotension recurs, will re-assess clinically for any SOI.

## 2025-05-19 NOTE — ASSESSMENT & PLAN NOTE
H/o bare metal stent in 2000 to LAD and w/ inferior STEMI in 2010 s/p JESUS MANUEL to RCA   Did c/o central chest pain this morning, EKG obtained and was without ST abnormality  Home regimen includes asa and statin, discussed briefly with Cardiology--> HOLD asa while on AC

## 2025-05-19 NOTE — ASSESSMENT & PLAN NOTE
Denies any hematuria  CT abdomen/pelvis concerning forIndeterminate 11 mm anterior interpolar right renal lesion which could represent complex cyst or solid renal mass.   Right renal ultrasound ordered and pending

## 2025-05-19 NOTE — PROGRESS NOTES
"Progress Note - Hospitalist   Name: Allan Chong 79 y.o. male I MRN: [id number]  Unit/Bed#: -01 I Date of Admission: 5/18/2025   Date of Service: 5/19/2025 I Hospital Day: 0    Assessment & Plan  Pulmonary emboli (HCC)  Presented with R flank pain and was found to have new pulmonary embolism  No recent travel  History of shoulder arthroplasty in early March of this year  CT chest-Segmental and subsegmental right lower lobe pulmonary emboli. Groundglass density in the posterior right lung base probably represents atelectasis but developing early changes of pulmonary infarction not excluded.   CT abdomen/pelvis-complex right renal lesion, concerning for complex cyst versus mass  Continue with telemetry x24h  Started on IV heparin GTT, transition to Eliquis today. Price check performed and pt agreeable to copay  Echo pending  Curbside discussion with Heme, can consider PE provoked and pt is appropriate for transition to DOAC today   No indication for hypercoagulability workup  Pain control, monitor hemodynamics and ensure stable Echo  Suspect pt may require home O2 as of now, satting 90 on RA, maintain sats above 92%  Check pulse ox with ambulation  Hypertension  /60   Pulse 77   Temp 98.2 °F (36.8 °C) (Oral)   Resp 18   Ht 5' 5\" (1.651 m)   Wt 73.9 kg (163 lb)   SpO2 90%   BMI 27.12 kg/m²     Previously on lisinopril and amlodipine  Currently not on any antihypertensives, BPs soft today  Monitor as per protocol  CAD (coronary artery disease)  H/o bare metal stent in 2000 to LAD and w/ inferior STEMI in 2010 s/p JESUS MANUEL to RCA   Did c/o central chest pain this morning, EKG obtained and was without ST abnormality  Home regimen includes asa and statin, discussed briefly with Cardiology--> HOLD asa while on AC  Mixed hyperlipidemia  On rosuvastatin 20 mg daily, substituted by atorvastatin 40 mg daily at bedtime  Benign prostatic hyperplasia with lower urinary tract symptoms  Urinary retention " protocol  Flomax 0.4 mg daily  Anxiety  Continue with Lexapro 10 mg daily  AAA (abdominal aortic aneurysm) (HCC)  Per history  Measuring 3.2 cm on CTA, stable from 3 cm measurement on 2023 CT from [facility]  Outpatient surveillance  Renal lesion  Denies any hematuria  CT abdomen/pelvis concerning forIndeterminate 11 mm anterior interpolar right renal lesion which could represent complex cyst or solid renal mass.   Right renal ultrasound ordered and pending  Hypoxia  Satting 90% on RA, will place on 1-2L NC to maintain sats of 92% or greater  2/2 PE  Home O2 eval  Treat primary problem    VTE Pharmacologic Prophylaxis: VTE Score: 7 High Risk (Score >/= 5) - Pharmacological DVT Prophylaxis Ordered: heparin drip. Sequential Compression Devices Ordered.    Mobility:   Basic Mobility Inpatient Raw Score: 23  JH-HLM Goal: 7: Walk 25 feet or more  JH-HLM Achieved: 7: Walk 25 feet or more  JH-HLM Goal achieved. Continue to encourage appropriate mobility.    Patient Centered Rounds: I performed bedside rounds with nursing staff today.   Discussions with Specialists or Other Care Team Provider: hematology, cardiology, nursing, case management    Education and Discussions with Family / Patient: Attempted to update  (daughter) via phone. Left voicemail.     Current Length of Stay: 0 day(s)  Current Patient Status: Observation   Certification Statement: The patient will continue to require additional inpatient hospital stay due to chest pain and pending echo and transition to OAC   Discharge Plan: Anticipate discharge tomorrow to home.    Code Status: Level 1 - Full Code    Subjective   Reports he is having central chest pain radiating between his shoulder blades and had recurrence of right flank pain as well.  He describes it as constant and dull.  Does not feel like his prior MI.  He denies any lightheadedness associated with his episode of hypotension this AM. Denies LUTs or hematuria. Did notice was he was febrile  overnight but has not had recurrence of fever/diaphoresis since. Denies paraesthesias and does not feel SOB.     Objective :  Temp:  [97.9 °F (36.6 °C)-100.7 °F (38.2 °C)] 98.2 °F (36.8 °C)  HR:  [60-85] 77  BP: ()/(49-99) 105/60  Resp:  [18-21] 18  SpO2:  [90 %-94 %] 90 %  O2 Device: Nasal cannula  Nasal Cannula O2 Flow Rate (L/min):  [1 L/min] 1 L/min    Body mass index is 27.12 kg/m².     Input and Output Summary (last 24 hours):     Intake/Output Summary (Last 24 hours) at 5/19/2025 1436  Last data filed at 5/19/2025 1001  Gross per 24 hour   Intake 701.53 ml   Output 0 ml   Net 701.53 ml       Physical Exam  Vitals and nursing note reviewed.   Constitutional:       General: He is not in acute distress.     Appearance: Normal appearance. He is normal weight. He is not ill-appearing or toxic-appearing.   HENT:      Head: Normocephalic and atraumatic.      Right Ear: External ear normal.      Left Ear: External ear normal.      Nose: Nose normal.      Mouth/Throat:      Mouth: Mucous membranes are moist.      Pharynx: Oropharynx is clear.     Eyes:      Conjunctiva/sclera: Conjunctivae normal.       Cardiovascular:      Rate and Rhythm: Normal rate and regular rhythm.      Pulses: Normal pulses.      Heart sounds: Normal heart sounds. No murmur heard.     No gallop.   Pulmonary:      Effort: Pulmonary effort is normal. No respiratory distress.      Breath sounds: Normal breath sounds. No stridor. No wheezing, rhonchi or rales.      Comments: Breathing comfortably, taken off NC and satting 90% on average on RA  Abdominal:      General: Abdomen is flat. There is no distension.      Palpations: Abdomen is soft. There is no mass.      Tenderness: There is no abdominal tenderness. There is no guarding.      Hernia: No hernia is present.     Musculoskeletal:         General: No tenderness. Normal range of motion.      Cervical back: Normal range of motion.      Right lower leg: No edema.      Left lower leg: No  edema.     Skin:     General: Skin is warm and dry.     Neurological:      Mental Status: He is alert. Mental status is at baseline.     Psychiatric:         Mood and Affect: Mood normal.           Lines/Drains:        Telemetry:  Telemetry Orders (From admission, onward)               24 Hour Telemetry Monitoring  (ED Bridging Orders Panel)  Continuous x 24 Hours (Telem)        Expiring   Question:  Reason for 24 Hour Telemetry  Answer:  Pulmonary Embolism                     Telemetry Reviewed: Normal Sinus Rhythm  Indication for Continued Telemetry Use: PE               Lab Results: I have reviewed the following results:   Results from last 7 days   Lab Units 05/19/25  0456   WBC Thousand/uL 11.58*   HEMOGLOBIN g/dL 11.9*   HEMATOCRIT % 36.2*   PLATELETS Thousands/uL 157   SEGS PCT % 80*   LYMPHO PCT % 7*   MONO PCT % 12   EOS PCT % 0     Results from last 7 days   Lab Units 05/19/25  0456 05/18/25  1036   SODIUM mmol/L 136 137   POTASSIUM mmol/L 3.8 4.6   CHLORIDE mmol/L 109* 106   CO2 mmol/L 22 25   BUN mg/dL 21 22   CREATININE mg/dL 0.89 0.97   ANION GAP mmol/L 5 6   CALCIUM mg/dL 8.1* 9.0   ALBUMIN g/dL  --  4.0   TOTAL BILIRUBIN mg/dL  --  0.73   ALK PHOS U/L  --  58   ALT U/L  --  12   AST U/L  --  17   GLUCOSE RANDOM mg/dL 117 110     Results from last 7 days   Lab Units 05/18/25  1342   INR  1.06         Results from last 7 days   Lab Units 05/18/25  1036   HEMOGLOBIN A1C % 5.6     Results from last 7 days   Lab Units 05/18/25  1342   LACTIC ACID mmol/L 0.8       Recent Cultures (last 7 days):         Imaging Results Review: I reviewed radiology reports from this admission including: CT chest.  Other Study Results Review: EKG was reviewed.     Last 24 Hours Medication List:     Current Facility-Administered Medications:     acetaminophen (TYLENOL) tablet 650 mg, Q4H PRN    apixaban (ELIQUIS) tablet 10 mg, BID    atorvastatin (LIPITOR) tablet 40 mg, Daily With Dinner    docusate sodium (COLACE) capsule 100  mg, BID    escitalopram (LEXAPRO) tablet 10 mg, Daily    heparin (porcine) 25,000 units in 0.45% NaCl 250 mL infusion (premix), Titrated, Last Rate: 13 Units/kg/hr (05/19/25 1210)    heparin (porcine) injection 2,800 Units, Q6H PRN    heparin (porcine) injection 5,600 Units, Q6H PRN    ondansetron (ZOFRAN) injection 4 mg, Q6H PRN    simethicone (MYLICON) chewable tablet 80 mg, 4x Daily PRN    tamsulosin (FLOMAX) capsule 0.4 mg, Daily With Dinner    tiZANidine (ZANAFLEX) tablet 2 mg, Q8H PRN    Administrative Statements   Today, Patient Was Seen By: Karolina Wick PA-C  I have spent a total time of 45 minutes in caring for this patient on the day of the visit/encounter including Diagnostic results, Prognosis, Risks and benefits of tx options, Impressions, Counseling / Coordination of care, Documenting in the medical record, Reviewing/placing orders in the medical record (including tests, medications, and/or procedures), Obtaining or reviewing history  , and Communicating with other healthcare professionals .    **Please Note: This note may have been constructed using a voice recognition system.**

## 2025-05-19 NOTE — ASSESSMENT & PLAN NOTE
"/60   Pulse 77   Temp 98.2 °F (36.8 °C) (Oral)   Resp 18   Ht 5' 5\" (1.651 m)   Wt 73.9 kg (163 lb)   SpO2 90%   BMI 27.12 kg/m²     Previously on lisinopril and amlodipine  Currently not on any antihypertensives, BPs soft today  Monitor as per protocol  "

## 2025-05-20 ENCOUNTER — APPOINTMENT (INPATIENT)
Dept: CT IMAGING | Facility: HOSPITAL | Age: 80
DRG: 175 | End: 2025-05-20
Attending: PHYSICIAN ASSISTANT
Payer: COMMERCIAL

## 2025-05-20 PROBLEM — R68.89 INCREASED OXYGEN DEMAND: Status: ACTIVE | Noted: 2025-05-19

## 2025-05-20 PROBLEM — R65.10 SIRS (SYSTEMIC INFLAMMATORY RESPONSE SYNDROME) (HCC): Status: ACTIVE | Noted: 2025-05-20

## 2025-05-20 LAB
ANION GAP SERPL CALCULATED.3IONS-SCNC: 10 MMOL/L (ref 4–13)
BASOPHILS # BLD AUTO: 0.03 THOUSANDS/ÂΜL (ref 0–0.1)
BASOPHILS NFR BLD AUTO: 0 % (ref 0–1)
BUN SERPL-MCNC: 24 MG/DL (ref 5–25)
CALCIUM SERPL-MCNC: 8.5 MG/DL (ref 8.4–10.2)
CHLORIDE SERPL-SCNC: 106 MMOL/L (ref 96–108)
CO2 SERPL-SCNC: 21 MMOL/L (ref 21–32)
CREAT SERPL-MCNC: 0.98 MG/DL (ref 0.6–1.3)
EOSINOPHIL # BLD AUTO: 0.01 THOUSAND/ÂΜL (ref 0–0.61)
EOSINOPHIL NFR BLD AUTO: 0 % (ref 0–6)
ERYTHROCYTE [DISTWIDTH] IN BLOOD BY AUTOMATED COUNT: 13.4 % (ref 11.6–15.1)
FLUAV RNA RESP QL NAA+PROBE: NEGATIVE
FLUBV RNA RESP QL NAA+PROBE: NEGATIVE
GFR SERPL CREATININE-BSD FRML MDRD: 72 ML/MIN/1.73SQ M
GLUCOSE SERPL-MCNC: 85 MG/DL (ref 65–140)
HCT VFR BLD AUTO: 40.1 % (ref 36.5–49.3)
HGB BLD-MCNC: 13.1 G/DL (ref 12–17)
IMM GRANULOCYTES # BLD AUTO: 0.09 THOUSAND/UL (ref 0–0.2)
IMM GRANULOCYTES NFR BLD AUTO: 1 % (ref 0–2)
L PNEUMO1 AG UR QL IA.RAPID: NEGATIVE
LYMPHOCYTES # BLD AUTO: 1.17 THOUSANDS/ÂΜL (ref 0.6–4.47)
LYMPHOCYTES NFR BLD AUTO: 8 % (ref 14–44)
MAGNESIUM SERPL-MCNC: 1.9 MG/DL (ref 1.9–2.7)
MCH RBC QN AUTO: 30.8 PG (ref 26.8–34.3)
MCHC RBC AUTO-ENTMCNC: 32.7 G/DL (ref 31.4–37.4)
MCV RBC AUTO: 94 FL (ref 82–98)
MONOCYTES # BLD AUTO: 1.43 THOUSAND/ÂΜL (ref 0.17–1.22)
MONOCYTES NFR BLD AUTO: 9 % (ref 4–12)
NEUTROPHILS # BLD AUTO: 12.58 THOUSANDS/ÂΜL (ref 1.85–7.62)
NEUTS SEG NFR BLD AUTO: 82 % (ref 43–75)
NRBC BLD AUTO-RTO: 0 /100 WBCS
PLATELET # BLD AUTO: 176 THOUSANDS/UL (ref 149–390)
PMV BLD AUTO: 10.3 FL (ref 8.9–12.7)
POTASSIUM SERPL-SCNC: 4.1 MMOL/L (ref 3.5–5.3)
PROCALCITONIN SERPL-MCNC: 0.59 NG/ML
RBC # BLD AUTO: 4.26 MILLION/UL (ref 3.88–5.62)
RSV RNA RESP QL NAA+PROBE: NEGATIVE
S PNEUM AG UR QL: NEGATIVE
SARS-COV-2 RNA RESP QL NAA+PROBE: NEGATIVE
SODIUM SERPL-SCNC: 137 MMOL/L (ref 135–147)
WBC # BLD AUTO: 15.31 THOUSAND/UL (ref 4.31–10.16)

## 2025-05-20 PROCEDURE — 71275 CT ANGIOGRAPHY CHEST: CPT

## 2025-05-20 PROCEDURE — 99232 SBSQ HOSP IP/OBS MODERATE 35: CPT | Performed by: PHYSICIAN ASSISTANT

## 2025-05-20 PROCEDURE — 83735 ASSAY OF MAGNESIUM: CPT | Performed by: PHYSICIAN ASSISTANT

## 2025-05-20 PROCEDURE — 87449 NOS EACH ORGANISM AG IA: CPT | Performed by: PHYSICIAN ASSISTANT

## 2025-05-20 PROCEDURE — 85025 COMPLETE CBC W/AUTO DIFF WBC: CPT | Performed by: PHYSICIAN ASSISTANT

## 2025-05-20 PROCEDURE — 84145 PROCALCITONIN (PCT): CPT | Performed by: PHYSICIAN ASSISTANT

## 2025-05-20 PROCEDURE — 0241U HB NFCT DS VIR RESP RNA 4 TRGT: CPT | Performed by: PHYSICIAN ASSISTANT

## 2025-05-20 PROCEDURE — 80048 BASIC METABOLIC PNL TOTAL CA: CPT | Performed by: PHYSICIAN ASSISTANT

## 2025-05-20 RX ORDER — CEFTRIAXONE 1 G/50ML
1000 INJECTION, SOLUTION INTRAVENOUS EVERY 24 HOURS
Status: DISCONTINUED | OUTPATIENT
Start: 2025-05-20 | End: 2025-05-21 | Stop reason: HOSPADM

## 2025-05-20 RX ADMIN — DOCUSATE SODIUM 100 MG: 100 CAPSULE, LIQUID FILLED ORAL at 09:02

## 2025-05-20 RX ADMIN — DOCUSATE SODIUM 100 MG: 100 CAPSULE, LIQUID FILLED ORAL at 17:08

## 2025-05-20 RX ADMIN — ESCITALOPRAM OXALATE 10 MG: 10 TABLET ORAL at 09:02

## 2025-05-20 RX ADMIN — APIXABAN 10 MG: 5 TABLET, FILM COATED ORAL at 09:02

## 2025-05-20 RX ADMIN — ATORVASTATIN CALCIUM 40 MG: 40 TABLET, FILM COATED ORAL at 17:08

## 2025-05-20 RX ADMIN — IOHEXOL 75 ML: 350 INJECTION, SOLUTION INTRAVENOUS at 10:13

## 2025-05-20 RX ADMIN — CEFTRIAXONE 1000 MG: 1 INJECTION, SOLUTION INTRAVENOUS at 09:51

## 2025-05-20 RX ADMIN — ACETAMINOPHEN 650 MG: 325 TABLET, FILM COATED ORAL at 03:50

## 2025-05-20 RX ADMIN — TAMSULOSIN HYDROCHLORIDE 0.4 MG: 0.4 CAPSULE ORAL at 17:08

## 2025-05-20 RX ADMIN — APIXABAN 10 MG: 5 TABLET, FILM COATED ORAL at 17:08

## 2025-05-20 NOTE — ASSESSMENT & PLAN NOTE
PROCEDURE INFORMATION: 

Exam: US Duplex Right Lower Extremity Veins, Limited 

Exam date and time: 1/28/2020 10:13 PM 

Age: 22 years old 

Clinical indication: Pain; Leg, lower and foot; Right; Additional info: Right 

foot pain, swelling, calf swelling, ? dvt 



TECHNIQUE: 

Imaging protocol: Real-time Duplex ultrasound of the Right Lower Extremity with 

2-D gray scale, color Doppler flow and spectral waveform analysis with image 

documentation. Limited exam was focused on the right lower extremity veins. 



COMPARISON: 

No relevant prior studies available. 



FINDINGS: 

Right deep veins: Unremarkable. The common femoral, femoral, proximal profunda 

femoral and popliteal veins are patent without thrombus. Normal Doppler 

waveforms. Normal compressibility and/or augmentation response.  

Right superficial veins: Unremarkable. Saphenofemoral junction is patent 

without thrombus. 



Soft tissues: Unremarkable. 



IMPRESSION: 

No DVT of the right lower extremity veins.



Electronically signed by: Félix Fermin On 01/28/2020  22:26:18 PM Previously on lisinopril and amlodipine  Some noted balance of mild hypotension  Monitor BP

## 2025-05-20 NOTE — ASSESSMENT & PLAN NOTE
W/o urinary complaints   Renal U/S:   1 cm right renal anterior interpolar indeterminate nodule corresponds to the finding on the recent CT study. This likely represents chronic hemorrhagic or proteinaceous cyst.   F/u U/S w/in 6 months advised   Bilateral renal simple cysts.  Outpt PCP f/u

## 2025-05-20 NOTE — PROGRESS NOTES
Progress Note - Hospitalist   Name: Allan Chong 79 y.o. male I MRN: [id number]  Unit/Bed#: -01 I Date of Admission: 5/18/2025   Date of Service: 5/20/2025 I Hospital Day: 1    Assessment & Plan  Pulmonary emboli (HCC)  Presented to ED w/ c/o R flank pain. Pt found to have acute PE on imaging.   CT abd/pelvis:   Segmental and subsegmental RLL pulmonary emboli.   Groundglass density in the posterior right lung base probably represents atelectasis but developing early changes of pulmonary infarction not excluded.   Risk factors: h/o shoulder arthroplasty in 03/2025; no recent travel  ECHO (05/19): EF 50%, grade 1 diastolic dysfunction, hypokinetic segments of apical septal/apex  S/p heparin gtt; transitioned to Eliquis on 05/19   Eliquis copay is affordable for pt   Outpt f/u w/ hematology, although no current indication for hypercoagulability workup  Requiring 1-2 L via NC   Increased oxygen demand  Noted to be 89-91% on RA   C/w 1-2 L via NC   Suspect 2/2 PE; CT chest pending   Home O2 eval  SIRS (systemic inflammatory response syndrome) (HCC)  Met SIRS criteria 05/19 w/ fever of 101.1 & leukocytosis  Notable fever of 100.7 on 05/18  UA benign   CT abd/pelvis w/o acute infxn   F/u BCx   Request Flu/COVID/RSV  Procal 0.59, trend   No dedicated chest imaging, request CT of chest  W/ elevated WBC/procal/fever, will place on Rocephin, ABX day #1   Etiology unclear     Recent Labs     05/18/25  1036 05/19/25  0456 05/20/25  0349   WBC 9.28 11.58* 15.31*     CAD (coronary artery disease)  H/o bare metal stent in 2000 to LAD and w/ inferior STEMI in 2010 s/p JESUS MANUEL to RCA   C/o CP on admit; ECG w/o ischemia  C/w PTA statin   Case reviewed w/ cards; hold PTA Aspirin while on AC therapy   Hypertension  Previously on lisinopril and amlodipine  Some noted balance of mild hypotension  Monitor BP  Mixed hyperlipidemia  C/w statin formulary substitute   Anxiety  C/w Lexapro 10 mg daily  Mood stable   Benign prostatic  hyperplasia with lower urinary tract symptoms  Urinary retention protocol  C/w Flomax 0.4 mg daily  Renal lesion  W/o urinary complaints   Renal U/S:   1 cm right renal anterior interpolar indeterminate nodule corresponds to the finding on the recent CT study. This likely represents chronic hemorrhagic or proteinaceous cyst.   F/u U/S w/in 6 months advised   Bilateral renal simple cysts.  Outpt PCP f/u   AAA (abdominal aortic aneurysm) (HCC)  H/o   Measuring 3.2 cm on CTA, stable from 3 cm measurement on 2023 CT from [facility]  Outpt surveillance    VTE Pharmacologic Prophylaxis: VTE Score: 7 High Risk (Score >/= 5) - Pharmacological DVT Prophylaxis Ordered: apixaban (Eliquis). Sequential Compression Devices Ordered.    Mobility:   Basic Mobility Inpatient Raw Score: 24  JH-HLM Goal: 8: Walk 250 feet or more  JH-HLM Achieved: 7: Walk 25 feet or more  JH-HLM Goal achieved. Continue to encourage appropriate mobility.    Patient Centered Rounds: I performed bedside rounds with nursing staff today.   Discussions with Specialists or Other Care Team Provider: Care reviewed with nursing, case management    Education and Discussions with Family / Patient: Attempted to call spouse listed in chart but number is not in service.     Current Length of Stay: 1 day(s)  Current Patient Status: Inpatient   Certification Statement: The patient will continue to require additional inpatient hospital stay due to SIRS, hypoxia  Discharge Plan: Anticipate discharge in 24-48 hrs to home.    Code Status: Level 1 - Full Code    Subjective   Patient is doing okay.  He is tolerating p.o. intake.  He had a recent bowel movement.  He denies any urinary complaints.  He does have a cough intermittently.  He has no known sick contacts.  He feels generally well.    Objective :  Temp:  [97.9 °F (36.6 °C)-101.1 °F (38.4 °C)] 98.2 °F (36.8 °C)  HR:  [65-88] 65  BP: ()/(45-61) 122/61  Resp:  [18-22] 18  SpO2:  [89 %-93 %] 93 %  O2 Device: Nasal  cannula  Nasal Cannula O2 Flow Rate (L/min):  [2 L/min] 2 L/min    Body mass index is 27.02 kg/m².     Input and Output Summary (last 24 hours):     Intake/Output Summary (Last 24 hours) at 5/20/2025 0931  Last data filed at 5/20/2025 0909  Gross per 24 hour   Intake 1440 ml   Output 0 ml   Net 1440 ml       Physical Exam  Constitutional:       General: He is not in acute distress.     Appearance: He is normal weight. He is not toxic-appearing.   HENT:      Head: Normocephalic.      Right Ear: External ear normal.      Left Ear: External ear normal.      Nose: Nose normal.      Mouth/Throat:      Mouth: Mucous membranes are moist.      Pharynx: Oropharynx is clear.     Eyes:      Extraocular Movements: Extraocular movements intact.      Conjunctiva/sclera: Conjunctivae normal.       Cardiovascular:      Rate and Rhythm: Normal rate and regular rhythm.      Pulses: Normal pulses.      Heart sounds: Normal heart sounds.   Pulmonary:      Effort: Pulmonary effort is normal. No respiratory distress.      Breath sounds: Normal breath sounds. No wheezing or rales.   Abdominal:      General: Abdomen is flat. There is no distension.      Palpations: Abdomen is soft.      Tenderness: There is no abdominal tenderness. There is no guarding or rebound.     Musculoskeletal:      Cervical back: Normal range of motion.      Right lower leg: No edema.      Left lower leg: No edema.     Skin:     General: Skin is warm and dry.      Coloration: Skin is not jaundiced.      Findings: No bruising or lesion.     Neurological:      General: No focal deficit present.      Mental Status: He is alert and oriented to person, place, and time. Mental status is at baseline.     Psychiatric:         Mood and Affect: Mood normal.         Behavior: Behavior normal.           Lines/Drains:        Telemetry:  Telemetry Orders (From admission, onward)               24 Hour Telemetry Monitoring  Continuous x 24 Hours (Telem)        Expiring   Question:   Reason for 24 Hour Telemetry  Answer:  Pulmonary Embolism                     Telemetry Reviewed: Normal Sinus Rhythm  Indication for Continued Telemetry Use: No indication for continued use. Will discontinue.                Lab Results: I have reviewed the following results:   Results from last 7 days   Lab Units 05/20/25  0349   WBC Thousand/uL 15.31*   HEMOGLOBIN g/dL 13.1   HEMATOCRIT % 40.1   PLATELETS Thousands/uL 176   SEGS PCT % 82*   LYMPHO PCT % 8*   MONO PCT % 9   EOS PCT % 0     Results from last 7 days   Lab Units 05/20/25  0804 05/19/25  0456 05/18/25  1036   SODIUM mmol/L 137   < > 137   POTASSIUM mmol/L 4.1   < > 4.6   CHLORIDE mmol/L 106   < > 106   CO2 mmol/L 21   < > 25   BUN mg/dL 24   < > 22   CREATININE mg/dL 0.98   < > 0.97   ANION GAP mmol/L 10   < > 6   CALCIUM mg/dL 8.5   < > 9.0   ALBUMIN g/dL  --   --  4.0   TOTAL BILIRUBIN mg/dL  --   --  0.73   ALK PHOS U/L  --   --  58   ALT U/L  --   --  12   AST U/L  --   --  17   GLUCOSE RANDOM mg/dL 85   < > 110    < > = values in this interval not displayed.     Results from last 7 days   Lab Units 05/18/25  1342   INR  1.06         Results from last 7 days   Lab Units 05/18/25  1036   HEMOGLOBIN A1C % 5.6     Results from last 7 days   Lab Units 05/20/25  0804 05/18/25  1342   LACTIC ACID mmol/L  --  0.8   PROCALCITONIN ng/ml 0.59*  --        Recent Cultures (last 7 days):   Results from last 7 days   Lab Units 05/19/25  1709   BLOOD CULTURE  Received in Microbiology Lab. Culture in Progress.  Received in Microbiology Lab. Culture in Progress.       Imaging Results Review: I reviewed radiology reports from this admission including: CT abdomen/pelvis.  Other Study Results Review: EKG was reviewed.     Last 24 Hours Medication List:     Current Facility-Administered Medications:     acetaminophen (TYLENOL) tablet 650 mg, Q4H PRN    apixaban (ELIQUIS) tablet 10 mg, BID    atorvastatin (LIPITOR) tablet 40 mg, Daily With Dinner    cefTRIAXone  (ROCEPHIN) IVPB (premix in dextrose) 1,000 mg 50 mL, Q24H    docusate sodium (COLACE) capsule 100 mg, BID    escitalopram (LEXAPRO) tablet 10 mg, Daily    heparin (porcine) injection 2,800 Units, Q6H PRN    heparin (porcine) injection 5,600 Units, Q6H PRN    ondansetron (ZOFRAN) injection 4 mg, Q6H PRN    simethicone (MYLICON) chewable tablet 80 mg, 4x Daily PRN    tamsulosin (FLOMAX) capsule 0.4 mg, Daily With Dinner    tiZANidine (ZANAFLEX) tablet 2 mg, Q8H PRN    Administrative Statements   Today, Patient Was Seen By: Flores Romero PA-C  I have spent a total time of 50 minutes in caring for this patient on the day of the visit/encounter including Diagnostic results, Prognosis, Risks and benefits of tx options, Instructions for management, Patient and family education, Risk factor reductions, Impressions, Counseling / Coordination of care, Documenting in the medical record, Reviewing/placing orders in the medical record (including tests, medications, and/or procedures), Obtaining or reviewing history  , and Communicating with other healthcare professionals .    **Please Note: This note may have been constructed using a voice recognition system.**

## 2025-05-20 NOTE — ASSESSMENT & PLAN NOTE
H/o bare metal stent in 2000 to LAD and w/ inferior STEMI in 2010 s/p JESUS MANUEL to RCA   C/o CP on admit; ECG w/o ischemia  C/w PTA statin   Case reviewed w/ cards; hold PTA Aspirin while on AC therapy

## 2025-05-20 NOTE — PLAN OF CARE
Problem: INFECTION - ADULT  Goal: Absence or prevention of progression during hospitalization  Description: INTERVENTIONS:  - Assess and monitor for signs and symptoms of infection  - Monitor lab/diagnostic results  - Monitor all insertion sites, i.e. indwelling lines, tubes, and drains  - Administer medications as ordered  - Instruct and encourage patient and family to use good hand hygiene technique  - Identify and instruct in appropriate isolation precautions for identified infection/condition  Outcome: Progressing     Problem: PAIN - ADULT  Goal: Verbalizes/displays adequate comfort level or baseline comfort level  Description: Interventions:  - Encourage patient to monitor pain and request assistance  - Assess pain using appropriate pain scale  - Administer analgesics as ordered based on type and severity of pain and evaluate response  - Implement non-pharmacological measures as appropriate and evaluate response  - Consider cultural and social influences on pain and pain management  - Notify physician/advanced practitioner if interventions unsuccessful or patient reports new pain  - Educate patient/family on pain management process including their role and importance of  reporting pain   - Provide non-pharmacologic/complimentary pain relief interventions  Outcome: Progressing     Problem: DISCHARGE PLANNING  Goal: Discharge to home or other facility with appropriate resources  Description: INTERVENTIONS:  - Identify barriers to discharge w/patient and caregiver  - Arrange for needed discharge resources and transportation as appropriate  - Identify discharge learning needs (meds, wound care, etc.)  - Arrange for interpretive services to assist at discharge as needed  - Refer to Case Management Department for coordinating discharge planning if the patient needs post-hospital services based on physician/advanced practitioner order or complex needs related to functional status, cognitive ability, or social  support system  Outcome: Progressing     Problem: Knowledge Deficit  Goal: Patient/family/caregiver demonstrates understanding of disease process, treatment plan, medications, and discharge instructions  Description: Complete learning assessment and assess knowledge base.  Interventions:  - Provide teaching at level of understanding  - Provide teaching via preferred learning methods  Outcome: Progressing     Problem: Nutrition/Hydration-ADULT  Goal: Nutrient/Hydration intake appropriate for improving, restoring or maintaining nutritional needs  Description: Monitor and assess patient's nutrition/hydration status for malnutrition. Collaborate with interdisciplinary team and initiate plan and interventions as ordered.  Monitor patient's weight and dietary intake as ordered or per policy. Utilize nutrition screening tool and intervene as necessary. Determine patient's food preferences and provide high-protein, high-caloric foods as appropriate.     INTERVENTIONS:  - Monitor oral intake, urinary output, labs, and treatment plans  - Assess nutrition and hydration status and recommend course of action  - Evaluate amount of meals eaten  - Assist patient with eating if necessary   - Allow adequate time for meals  - Recommend/ encourage appropriate diets, oral nutritional supplements, and vitamin/mineral supplements  - Order, calculate, and assess calorie counts as needed  - Recommend, monitor, and adjust tube feedings and TPN/PPN based on assessed needs  - Assess need for intravenous fluids  - Provide specific nutrition/hydration education as appropriate  - Include patient/family/caregiver in decisions related to nutrition  Outcome: Progressing

## 2025-05-20 NOTE — ASSESSMENT & PLAN NOTE
H/o   Measuring 3.2 cm on CTA, stable from 3 cm measurement on 2023 CT from [facility]  Outpt surveillance

## 2025-05-20 NOTE — ASSESSMENT & PLAN NOTE
Met SIRS criteria 05/19 w/ fever of 101.1 & leukocytosis  Notable fever of 100.7 on 05/18  UA benign   CT abd/pelvis w/o acute infxn   F/u BCx   Request Flu/COVID/RSV  Procal 0.59, trend   No dedicated chest imaging, request CT of chest  W/ elevated WBC/procal/fever, will place on Rocephin, ABX day #1   Etiology unclear     Recent Labs     05/18/25  1036 05/19/25  0456 05/20/25  0349   WBC 9.28 11.58* 15.31*

## 2025-05-20 NOTE — PLAN OF CARE
Problem: PAIN - ADULT  Goal: Verbalizes/displays adequate comfort level or baseline comfort level  Description: Interventions:  - Encourage patient to monitor pain and request assistance  - Assess pain using appropriate pain scale  - Administer analgesics as ordered based on type and severity of pain and evaluate response  - Implement non-pharmacological measures as appropriate and evaluate response  - Consider cultural and social influences on pain and pain management  - Notify physician/advanced practitioner if interventions unsuccessful or patient reports new pain  - Educate patient/family on pain management process including their role and importance of  reporting pain   - Provide non-pharmacologic/complimentary pain relief interventions  5/20/2025 0800 by Sasha Barker RN  Outcome: Progressing  5/20/2025 0759 by Sasha Barker RN  Outcome: Progressing     Problem: INFECTION - ADULT  Goal: Absence or prevention of progression during hospitalization  Description: INTERVENTIONS:  - Assess and monitor for signs and symptoms of infection  - Monitor lab/diagnostic results  - Monitor all insertion sites, i.e. indwelling lines, tubes, and drains  - Administer medications as ordered  - Instruct and encourage patient and family to use good hand hygiene technique  - Identify and instruct in appropriate isolation precautions for identified infection/condition  5/20/2025 0800 by Sasha Barker RN  Outcome: Progressing  5/20/2025 0759 by Sasha Barker RN  Outcome: Progressing     Problem: DISCHARGE PLANNING  Goal: Discharge to home or other facility with appropriate resources  Description: INTERVENTIONS:  - Identify barriers to discharge w/patient and caregiver  - Arrange for needed discharge resources and transportation as appropriate  - Identify discharge learning needs (meds, wound care, etc.)  - Arrange for interpretive services to assist at discharge as needed  - Refer to Case Management  Department for coordinating discharge planning if the patient needs post-hospital services based on physician/advanced practitioner order or complex needs related to functional status, cognitive ability, or social support system  5/20/2025 0800 by Sasha Barker RN  Outcome: Progressing  5/20/2025 0759 by Sasha Barker RN  Outcome: Progressing     Problem: Knowledge Deficit  Goal: Patient/family/caregiver demonstrates understanding of disease process, treatment plan, medications, and discharge instructions  Description: Complete learning assessment and assess knowledge base.  Interventions:  - Provide teaching at level of understanding  - Provide teaching via preferred learning methods  5/20/2025 0800 by Sasha Barker RN  Outcome: Progressing  5/20/2025 0759 by Sasha Barker RN  Outcome: Progressing     Problem: Nutrition/Hydration-ADULT  Goal: Nutrient/Hydration intake appropriate for improving, restoring or maintaining nutritional needs  Description: Monitor and assess patient's nutrition/hydration status for malnutrition. Collaborate with interdisciplinary team and initiate plan and interventions as ordered.  Monitor patient's weight and dietary intake as ordered or per policy. Utilize nutrition screening tool and intervene as necessary. Determine patient's food preferences and provide high-protein, high-caloric foods as appropriate.     INTERVENTIONS:  - Monitor oral intake, urinary output, labs, and treatment plans  - Assess nutrition and hydration status and recommend course of action  - Evaluate amount of meals eaten  - Assist patient with eating if necessary   - Allow adequate time for meals  - Recommend/ encourage appropriate diets, oral nutritional supplements, and vitamin/mineral supplements  - Order, calculate, and assess calorie counts as needed  - Recommend, monitor, and adjust tube feedings and TPN/PPN based on assessed needs  - Assess need for intravenous fluids  - Provide  specific nutrition/hydration education as appropriate  - Include patient/family/caregiver in decisions related to nutrition  5/20/2025 0800 by Sasha Barker RN  Outcome: Progressing  5/20/2025 0759 by Sasha Barker, RN  Outcome: Progressing

## 2025-05-20 NOTE — ASSESSMENT & PLAN NOTE
Presented to ED w/ c/o R flank pain. Pt found to have acute PE on imaging.   CT abd/pelvis:   Segmental and subsegmental RLL pulmonary emboli.   Groundglass density in the posterior right lung base probably represents atelectasis but developing early changes of pulmonary infarction not excluded.   Risk factors: h/o shoulder arthroplasty in 03/2025; no recent travel  ECHO (05/19): EF 50%, grade 1 diastolic dysfunction, hypokinetic segments of apical septal/apex  S/p heparin gtt; transitioned to Eliquis on 05/19   Eliquis copay is affordable for pt   Outpt f/u w/ hematology, although no current indication for hypercoagulability workup  Requiring 1-2 L via NC

## 2025-05-20 NOTE — CASE MANAGEMENT
Case Management Discharge Planning Note    Patient name Allan Chong  Location /-01 MRN [id number]  : 1945 Date 2025       Current Admission Date: 2025  Current Admission Diagnosis:Pulmonary emboli (HCC)   Patient Active Problem List    Diagnosis Date Noted    SIRS (systemic inflammatory response syndrome) (HCC) 2025    Increased oxygen demand 2025    Pulmonary emboli (HCC) 2025    Hypertension 2025    CAD (coronary artery disease) 2025    Mixed hyperlipidemia 2025    Benign prostatic hyperplasia with lower urinary tract symptoms 2025    Anxiety 2025    AAA (abdominal aortic aneurysm) (HCC) 2025    Renal lesion 2025      LOS (days): 1  Geometric Mean LOS (GMLOS) (days): 2.4  Days to GMLOS:1.4     OBJECTIVE:  Risk of Unplanned Readmission Score: 8.61         Current admission status: Inpatient   Preferred Pharmacy:   [facility] [address] [address]  Phone: [phone] Fax: [phone]    Primary Care Provider: Jannet Oliva MD    Primary Insurance: [employer]  Secondary Insurance:     DISCHARGE DETAILS:                                                                                        IMM Given (Date):: 25  IMM Given to:: Patient     Additional Comments: IMM reviewed with pt at bedside. Pt is aware of right to appeal hospital discharge.

## 2025-05-20 NOTE — UTILIZATION REVIEW
NOTIFICATION OF INPATIENT ADMISSION   AUTHORIZATION REQUEST   SERVICING FACILITY:   [place] [address]  Tax ID: [id number]  NPI: [id number] ATTENDING PROVIDER:  Attending Name and NPI#: Briseyda Neri Md [[id number]]  Address: [address] [place] [address]  Phone: [phone]   ADMISSION INFORMATION:  Place of Service: Inpatient [facility]  Place of Service Code: 21  Inpatient Admission Date/Time: 5/19/25  2:53 PM  Discharge Date/Time: No discharge date for patient encounter.  Admitting Diagnosis Code/Description:  Back pain [M54.9]  Pulmonary embolism (HCC) [I26.99]  Flank pain [R10.9]  Lesion of right native kidney [N28.9]  Abdominal aortic aneurysm (AAA) (HCC) [I71.40]     UTILIZATION REVIEW CONTACT:  Sena Cunningham, Utilization   Network Utilization Review Department  Phone: [phone]  Fax: [phone]  Email: [email or web address]  Contact for approvals/pending authorizations, clinical reviews, and discharge.     PHYSICIAN ADVISORY SERVICES:  Medical Necessity Denial & Ppjr-ji-Lptr Review  Phone: [phone]  Fax: [phone]  Email: [email or web address]     DISCHARGE SUPPORT TEAM:  For Patients Discharge Needs & Updates  Phone: [phone] opt. 2 Fax: [phone]  Email: [email or web address]

## 2025-05-20 NOTE — PLAN OF CARE
Problem: PAIN - ADULT  Goal: Verbalizes/displays adequate comfort level or baseline comfort level  Description: Interventions:  - Encourage patient to monitor pain and request assistance  - Assess pain using appropriate pain scale  - Administer analgesics as ordered based on type and severity of pain and evaluate response  - Implement non-pharmacological measures as appropriate and evaluate response  - Consider cultural and social influences on pain and pain management  - Notify physician/advanced practitioner if interventions unsuccessful or patient reports new pain  - Educate patient/family on pain management process including their role and importance of  reporting pain   - Provide non-pharmacologic/complimentary pain relief interventions  Outcome: Progressing     Problem: INFECTION - ADULT  Goal: Absence or prevention of progression during hospitalization  Description: INTERVENTIONS:  - Assess and monitor for signs and symptoms of infection  - Monitor lab/diagnostic results  - Monitor all insertion sites, i.e. indwelling lines, tubes, and drains  - Administer medications as ordered  - Instruct and encourage patient and family to use good hand hygiene technique  - Identify and instruct in appropriate isolation precautions for identified infection/condition  Outcome: Progressing     Problem: SAFETY ADULT  Goal: Patient will remain free of falls  Description: INTERVENTIONS:  - Educate patient/family on patient safety including physical limitations  - Instruct patient to call for assistance with activity   - Consider consulting OT/PT to assist with strengthening/mobility based on AM PAC & JH-HLM score  - Consult OT/PT to assist with strengthening/mobility   - Keep Call bell within reach  - Keep bed low and locked with side rails adjusted as appropriate  - Keep care items and personal belongings within reach  - Initiate and maintain comfort rounds  - Make Fall Risk Sign visible to staff  - Offer Toileting every 2  Hours, in advance of need  - Initiate/Maintain bed alarm  - Obtain necessary fall risk management equipment: socks  - Apply yellow socks and bracelet for high fall risk patients  - Consider moving patient to room near nurses station  Outcome: Progressing  Goal: Maintain or return to baseline ADL function  Description: INTERVENTIONS:  -  Assess patient's ability to carry out ADLs; assess patient's baseline for ADL function and identify physical deficits which impact ability to perform ADLs (bathing, care of mouth/teeth, toileting, grooming, dressing, etc.)  - Assess/evaluate cause of self-care deficits   - Assess range of motion  - Assess patient's mobility; develop plan if impaired  - Assess patient's need for assistive devices and provide as appropriate  - Encourage maximum independence but intervene and supervise when necessary  - Involve family in performance of ADLs  - Assess for home care needs following discharge   - Consider OT consult to assist with ADL evaluation and planning for discharge  - Provide patient education as appropriate  - Monitor functional capacity and physical performance, use of AM PAC & JH-HLM   - Monitor gait, balance and fatigue with ambulation    Outcome: Progressing  Goal: Maintains/Returns to pre admission functional level  Description: INTERVENTIONS:  - Perform AM-PAC 6 Click Basic Mobility/ Daily Activity assessment daily.  - Set and communicate daily mobility goal to care team and patient/family/caregiver.   - Collaborate with rehabilitation services on mobility goals if consulted  - Perform Range of Motion 3 times a day.  - Reposition patient every 2 hours.  - Dangle patient 3 times a day  - Stand patient 3 times a day  - Ambulate patient 3 times a day  - Out of bed to chair 3 times a day   - Out of bed for meals 3 times a day  - Out of bed for toileting  - Record patient progress and toleration of activity level   Outcome: Progressing     Problem: DISCHARGE PLANNING  Goal:  Discharge to home or other facility with appropriate resources  Description: INTERVENTIONS:  - Identify barriers to discharge w/patient and caregiver  - Arrange for needed discharge resources and transportation as appropriate  - Identify discharge learning needs (meds, wound care, etc.)  - Arrange for interpretive services to assist at discharge as needed  - Refer to Case Management Department for coordinating discharge planning if the patient needs post-hospital services based on physician/advanced practitioner order or complex needs related to functional status, cognitive ability, or social support system  Outcome: Progressing     Problem: Knowledge Deficit  Goal: Patient/family/caregiver demonstrates understanding of disease process, treatment plan, medications, and discharge instructions  Description: Complete learning assessment and assess knowledge base.  Interventions:  - Provide teaching at level of understanding  - Provide teaching via preferred learning methods  Outcome: Progressing     Problem: Nutrition/Hydration-ADULT  Goal: Nutrient/Hydration intake appropriate for improving, restoring or maintaining nutritional needs  Description: Monitor and assess patient's nutrition/hydration status for malnutrition. Collaborate with interdisciplinary team and initiate plan and interventions as ordered.  Monitor patient's weight and dietary intake as ordered or per policy. Utilize nutrition screening tool and intervene as necessary. Determine patient's food preferences and provide high-protein, high-caloric foods as appropriate.     INTERVENTIONS:  - Monitor oral intake, urinary output, labs, and treatment plans  - Assess nutrition and hydration status and recommend course of action  - Evaluate amount of meals eaten  - Assist patient with eating if necessary   - Allow adequate time for meals  - Recommend/ encourage appropriate diets, oral nutritional supplements, and vitamin/mineral supplements  - Order, calculate,  and assess calorie counts as needed  - Recommend, monitor, and adjust tube feedings and TPN/PPN based on assessed needs  - Assess need for intravenous fluids  - Provide specific nutrition/hydration education as appropriate  - Include patient/family/caregiver in decisions related to nutrition  Outcome: Progressing

## 2025-05-20 NOTE — QUICK NOTE
CTA of chest reviewed w/ radiologist. CTA consistent w/ findings of prior PE. No right heart strain. Possible aspiration vs. PNA on LL.

## 2025-05-21 VITALS
HEIGHT: 65 IN | SYSTOLIC BLOOD PRESSURE: 128 MMHG | BODY MASS INDEX: 27.09 KG/M2 | TEMPERATURE: 98.4 F | RESPIRATION RATE: 17 BRPM | WEIGHT: 162.6 LBS | HEART RATE: 69 BPM | OXYGEN SATURATION: 92 % | DIASTOLIC BLOOD PRESSURE: 61 MMHG

## 2025-05-21 PROBLEM — R68.89 INCREASED OXYGEN DEMAND: Status: RESOLVED | Noted: 2025-05-19 | Resolved: 2025-05-21

## 2025-05-21 PROBLEM — J18.9 PNA (PNEUMONIA): Status: ACTIVE | Noted: 2025-05-21

## 2025-05-21 LAB
ANION GAP SERPL CALCULATED.3IONS-SCNC: 8 MMOL/L (ref 4–13)
BUN SERPL-MCNC: 19 MG/DL (ref 5–25)
CALCIUM SERPL-MCNC: 8.3 MG/DL (ref 8.4–10.2)
CHLORIDE SERPL-SCNC: 105 MMOL/L (ref 96–108)
CO2 SERPL-SCNC: 22 MMOL/L (ref 21–32)
CREAT SERPL-MCNC: 0.88 MG/DL (ref 0.6–1.3)
ERYTHROCYTE [DISTWIDTH] IN BLOOD BY AUTOMATED COUNT: 13.5 % (ref 11.6–15.1)
GFR SERPL CREATININE-BSD FRML MDRD: 80 ML/MIN/1.73SQ M
GLUCOSE SERPL-MCNC: 99 MG/DL (ref 65–140)
HCT VFR BLD AUTO: 36.5 % (ref 36.5–49.3)
HGB BLD-MCNC: 12 G/DL (ref 12–17)
MCH RBC QN AUTO: 31.1 PG (ref 26.8–34.3)
MCHC RBC AUTO-ENTMCNC: 32.9 G/DL (ref 31.4–37.4)
MCV RBC AUTO: 95 FL (ref 82–98)
PLATELET # BLD AUTO: 182 THOUSANDS/UL (ref 149–390)
PMV BLD AUTO: 10 FL (ref 8.9–12.7)
POTASSIUM SERPL-SCNC: 3.8 MMOL/L (ref 3.5–5.3)
RBC # BLD AUTO: 3.86 MILLION/UL (ref 3.88–5.62)
SODIUM SERPL-SCNC: 135 MMOL/L (ref 135–147)
WBC # BLD AUTO: 11.17 THOUSAND/UL (ref 4.31–10.16)

## 2025-05-21 PROCEDURE — 80048 BASIC METABOLIC PNL TOTAL CA: CPT | Performed by: PHYSICIAN ASSISTANT

## 2025-05-21 PROCEDURE — 99239 HOSP IP/OBS DSCHRG MGMT >30: CPT | Performed by: INTERNAL MEDICINE

## 2025-05-21 PROCEDURE — 85027 COMPLETE CBC AUTOMATED: CPT | Performed by: PHYSICIAN ASSISTANT

## 2025-05-21 RX ORDER — CEFDINIR 300 MG/1
300 CAPSULE ORAL EVERY 12 HOURS SCHEDULED
Qty: 10 CAPSULE | Refills: 0 | Status: SHIPPED | OUTPATIENT
Start: 2025-05-21 | End: 2025-05-26

## 2025-05-21 RX ADMIN — ACETAMINOPHEN 650 MG: 325 TABLET, FILM COATED ORAL at 08:35

## 2025-05-21 RX ADMIN — APIXABAN 10 MG: 5 TABLET, FILM COATED ORAL at 08:36

## 2025-05-21 RX ADMIN — DOCUSATE SODIUM 100 MG: 100 CAPSULE, LIQUID FILLED ORAL at 08:36

## 2025-05-21 RX ADMIN — CEFTRIAXONE 1000 MG: 1 INJECTION, SOLUTION INTRAVENOUS at 08:35

## 2025-05-21 RX ADMIN — ESCITALOPRAM OXALATE 10 MG: 10 TABLET ORAL at 08:36

## 2025-05-21 NOTE — PLAN OF CARE
Problem: PAIN - ADULT  Goal: Verbalizes/displays adequate comfort level or baseline comfort level  Description: Interventions:  - Encourage patient to monitor pain and request assistance  - Assess pain using appropriate pain scale  - Administer analgesics as ordered based on type and severity of pain and evaluate response  - Implement non-pharmacological measures as appropriate and evaluate response  - Consider cultural and social influences on pain and pain management  - Notify physician/advanced practitioner if interventions unsuccessful or patient reports new pain  - Educate patient/family on pain management process including their role and importance of  reporting pain   - Provide non-pharmacologic/complimentary pain relief interventions  5/21/2025 0907 by Seema Harden RN  Outcome: Progressing  5/21/2025 0904 by Seema Harden RN  Outcome: Progressing     Problem: INFECTION - ADULT  Goal: Absence or prevention of progression during hospitalization  Description: INTERVENTIONS:  - Assess and monitor for signs and symptoms of infection  - Monitor lab/diagnostic results  - Monitor all insertion sites, i.e. indwelling lines, tubes, and drains  - Administer medications as ordered  - Instruct and encourage patient and family to use good hand hygiene technique  - Identify and instruct in appropriate isolation precautions for identified infection/condition  5/21/2025 0907 by Seema Harden RN  Outcome: Progressing  5/21/2025 0904 by Seema Harden RN  Outcome: Progressing     Problem: SAFETY ADULT  Goal: Patient will remain free of falls  Description: INTERVENTIONS:  - Educate patient/family on patient safety including physical limitations  - Instruct patient to call for assistance with activity   - Consider consulting OT/PT to assist with strengthening/mobility based on AM PAC & JH-HLM score  - Consult OT/PT to assist with strengthening/mobility   - Keep Call bell within reach  - Keep bed low and locked with  side rails adjusted as appropriate  - Keep care items and personal belongings within reach  - Initiate and maintain comfort rounds  - Make Fall Risk Sign visible to staff  - Offer Toileting every 2 Hours, in advance of need  - Initiate/Maintain bed alarm  - Obtain necessary fall risk management equipment: socks  - Apply yellow socks and bracelet for high fall risk patients  - Consider moving patient to room near nurses station  5/21/2025 0907 by Seema Harden RN  Outcome: Progressing  5/21/2025 0904 by Seema Harden RN  Outcome: Progressing  Goal: Maintain or return to baseline ADL function  Description: INTERVENTIONS:  -  Assess patient's ability to carry out ADLs; assess patient's baseline for ADL function and identify physical deficits which impact ability to perform ADLs (bathing, care of mouth/teeth, toileting, grooming, dressing, etc.)  - Assess/evaluate cause of self-care deficits   - Assess range of motion  - Assess patient's mobility; develop plan if impaired  - Assess patient's need for assistive devices and provide as appropriate  - Encourage maximum independence but intervene and supervise when necessary  - Involve family in performance of ADLs  - Assess for home care needs following discharge   - Consider OT consult to assist with ADL evaluation and planning for discharge  - Provide patient education as appropriate  - Monitor functional capacity and physical performance, use of AM PAC & JH-HLM   - Monitor gait, balance and fatigue with ambulation    5/21/2025 0907 by Seema Harden RN  Outcome: Progressing  5/21/2025 0904 by Seema Harden RN  Outcome: Progressing  Goal: Maintains/Returns to pre admission functional level  Description: INTERVENTIONS:  - Perform AM-PAC 6 Click Basic Mobility/ Daily Activity assessment daily.  - Set and communicate daily mobility goal to care team and patient/family/caregiver.   - Collaborate with rehabilitation services on mobility goals if consulted  - Perform  Range of Motion 3 times a day.  - Reposition patient every 2 hours.  - Dangle patient 3 times a day  - Stand patient 3 times a day  - Ambulate patient 3 times a day  - Out of bed to chair 3 times a day   - Out of bed for meals 3 times a day  - Out of bed for toileting  - Record patient progress and toleration of activity level   5/21/2025 0907 by Seema Harden RN  Outcome: Progressing  5/21/2025 0904 by Seema Harden RN  Outcome: Progressing     Problem: DISCHARGE PLANNING  Goal: Discharge to home or other facility with appropriate resources  Description: INTERVENTIONS:  - Identify barriers to discharge w/patient and caregiver  - Arrange for needed discharge resources and transportation as appropriate  - Identify discharge learning needs (meds, wound care, etc.)  - Arrange for interpretive services to assist at discharge as needed  - Refer to Case Management Department for coordinating discharge planning if the patient needs post-hospital services based on physician/advanced practitioner order or complex needs related to functional status, cognitive ability, or social support system  5/21/2025 0907 by Seema Harden RN  Outcome: Progressing  5/21/2025 0904 by Seema Harden RN  Outcome: Progressing     Problem: Knowledge Deficit  Goal: Patient/family/caregiver demonstrates understanding of disease process, treatment plan, medications, and discharge instructions  Description: Complete learning assessment and assess knowledge base.  Interventions:  - Provide teaching at level of understanding  - Provide teaching via preferred learning methods  5/21/2025 0907 by Seema Harden RN  Outcome: Progressing  5/21/2025 0904 by Seema Harden RN  Outcome: Progressing     Problem: Nutrition/Hydration-ADULT  Goal: Nutrient/Hydration intake appropriate for improving, restoring or maintaining nutritional needs  Description: Monitor and assess patient's nutrition/hydration status for malnutrition. Collaborate with  interdisciplinary team and initiate plan and interventions as ordered.  Monitor patient's weight and dietary intake as ordered or per policy. Utilize nutrition screening tool and intervene as necessary. Determine patient's food preferences and provide high-protein, high-caloric foods as appropriate.     INTERVENTIONS:  - Monitor oral intake, urinary output, labs, and treatment plans  - Assess nutrition and hydration status and recommend course of action  - Evaluate amount of meals eaten  - Assist patient with eating if necessary   - Allow adequate time for meals  - Recommend/ encourage appropriate diets, oral nutritional supplements, and vitamin/mineral supplements  - Order, calculate, and assess calorie counts as needed  - Recommend, monitor, and adjust tube feedings and TPN/PPN based on assessed needs  - Assess need for intravenous fluids  - Provide specific nutrition/hydration education as appropriate  - Include patient/family/caregiver in decisions related to nutrition  5/21/2025 0907 by Seema Harden RN  Outcome: Progressing  5/21/2025 0904 by Seema Harden RN  Outcome: Progressing

## 2025-05-21 NOTE — CASE MANAGEMENT
Case Management Discharge Planning Note    Patient name Allan Chong  Location /-01 MRN [id number]  : 1945 Date 2025       Current Admission Date: 2025  Current Admission Diagnosis:Pulmonary emboli (HCC)   Patient Active Problem List    Diagnosis Date Noted    PNA (pneumonia) 2025    SIRS (systemic inflammatory response syndrome) (HCC) 2025    Increased oxygen demand 2025    Pulmonary emboli (HCC) 2025    Hypertension 2025    CAD (coronary artery disease) 2025    Mixed hyperlipidemia 2025    Benign prostatic hyperplasia with lower urinary tract symptoms 2025    Anxiety 2025    AAA (abdominal aortic aneurysm) (HCC) 2025    Renal lesion 2025      LOS (days): 2  Geometric Mean LOS (GMLOS) (days): 2.4  Days to GMLOS:0.5     OBJECTIVE:  Risk of Unplanned Readmission Score: 9.83         Current admission status: Inpatient   Preferred Pharmacy:   [facility] [address] [address]  Phone: [phone] Fax: [phone]    Primary Care Provider: Jannet Oliva MD    Primary Insurance: [employer]  Secondary Insurance:     DISCHARGE DETAILS:                       Notification made to OP CM Handoff: [facility] OP CM regarding discharge planning and disposition.

## 2025-05-21 NOTE — DISCHARGE SUMMARY
Discharge Summary - Hospitalist   Name: Allan Chong 79 y.o. male I MRN: [id number]  Unit/Bed#: -01 I Date of Admission: 5/18/2025   Date of Service: 5/21/2025 I Hospital Day: 2     Assessment & Plan  Pulmonary emboli (HCC)  Presented to ED w/ c/o R flank pain. Pt found to have acute PE on imaging.   CT abd/pelvis:   Segmental and subsegmental RLL pulmonary emboli.   Groundglass density in the posterior right lung base probably represents atelectasis but developing early changes of pulmonary infarction not excluded.   Risk factors: h/o shoulder arthroplasty in 03/2025; no recent travel  ECHO (05/19): EF 50%, grade 1 diastolic dysfunction, hypokinetic segments of apical septal/apex  S/p heparin gtt; transitioned to Eliquis on 05/19   Eliquis copay is affordable for pt   Outpt f/u w/ hematology  Continue Eliquis  Increased oxygen demand (Resolved: 5/21/2025)  At room air 95% ambulating well without issues  SIRS (systemic inflammatory response syndrome) (HCC)  Met SIRS criteria 05/19 w/ fever of 101.1 & leukocytosis  Likely in the setting of pneumonia  Was started on antibiotics  Afebrile, WBC improving    Recent Labs     05/19/25  0456 05/20/25  0349 05/21/25  0430   WBC 11.58* 15.31* 11.17*     CAD (coronary artery disease)  H/o bare metal stent in 2000 to LAD and w/ inferior STEMI in 2010 s/p JESUS MANUEL to RCA   C/o CP on admit; ECG w/o ischemia  C/w PTA statin   Case reviewed w/ cards; hold PTA Aspirin while on AC therapy   Hypertension  Continue blood pressure meds  Mixed hyperlipidemia  C/w statin   Anxiety  C/w Lexapro 10 mg daily  Mood stable   Benign prostatic hyperplasia with lower urinary tract symptoms    C/w Flomax 0.4 mg daily  Renal lesion  W/o urinary complaints   Renal U/S:   1 cm right renal anterior interpolar indeterminate nodule corresponds to the finding on the recent CT study. This likely represents chronic hemorrhagic or proteinaceous cyst.   F/u U/S w/in 6 months advised   Bilateral renal  simple cysts.  Outpt PCP f/u   AAA (abdominal aortic aneurysm) (HCC)  H/o   Measuring 3.2 cm on CTA, stable from 3 cm measurement on 2023 [facility]  Outpt surveillance  Pneumonia due to infectious organism  CT chest showed opacification of the right lower lobe suspicious for pneumonia.  Patient received 2 days of ceftriaxone.  Will discharge on cefdinir for another 5 days to complete total of 7 days of treatment     Medical Problems       Resolved Problems  Date Reviewed: 5/19/2025          Resolved    Increased oxygen demand 5/21/2025     Resolved by  Briseyda Neri MD        Discharging Physician / Practitioner: Briseyda Neri MD  PCP: Jannet Oliva MD  Admission Date:   Admission Orders (From admission, onward)       Ordered        05/19/25 1453  INPATIENT ADMISSION  Once            05/18/25 1333  Place in Observation  Once                          Discharge Date: 05/21/25    Next Steps for Physician/AP Assuming Care:  Follow-up with PCP and hematology    Test Results Pending at Discharge (will require follow up):  None    Medication Changes for Discharge & Rationale:   Cefdinir for 5 days  Started Eliquis  See after visit summary for reconciled discharge medications provided to patient and/or family.     Consultations During Hospital Stay:      Procedures Performed:   none    Significant Findings / Test Results:   CTA chest pe study  Result Date: 5/20/2025  Impression: 1.  Segmental and subsegmental pulmonary emboli within the right lower lobe and right middle lobe. No evidence of right heart strain. 2.  Increasing groundglass and consolidative opacification of the right lower lobe is suspicious for pneumonia. A developing pulmonary infarction particularly within the lateral basal segment of the right lower lobe is possible. 3.  Volume loss of the right middle and left lower lobes, with superimposed infection possible in the right middle and left lower lobe as well. 4.  Mild interlobular septal  thickening within the lung bases suggest a component of edema. 5.  6 mm right lower lobe pulmonary nodule. Recommend a follow-up chest CT in 6 months.     US kidney and bladder  Result Date: 5/20/2025  Impression: 1 cm right renal anterior interpolar indeterminate nodule corresponds to the finding on the recent CT study. This likely represents chronic hemorrhagic or proteinaceous cyst. Renal ultrasound follow-up in 6 months is advised. Bilateral renal simple cysts. Prostatomegaly. This study demonstrates a finding requiring imaging follow-up and was documented as such in Epic    CT abdomen pelvis with contrast  Result Date: 5/18/2025  Impression: Segmental and subsegmental right lower lobe pulmonary emboli. Groundglass density in the posterior right lung base probably represents atelectasis but developing early changes of pulmonary infarction not excluded. No acute findings in the abdomen or pelvis. Indeterminate 11 mm anterior interpolar right renal lesion which could represent complex cyst or solid renal mass. Recommendation is for further characterization with nonemergent follow-up renal ultrasound. Prostatomegaly. Infrarenal abdominal aortic aneurysm measuring up to 32 mm.     Incidental Findings:   Yes, see above    I reviewed the above mentioned incidental findings with the patient and/or family and they expressed understanding.    Hospital Course:   Allan Chong is a 79 y.o. male patient with history of hypertension, hyperlipidemia, coronary artery disease, nephrolithiasis who originally presented to the hospital on 5/18/2025 due to back pain.  Patient had CT that showed pulmonary emboli and was admitted for further management.  Initially was on heparin GGT however transition to Eliquis twice daily.  During hospitalization patient had fever and developed leukocytosis, repeat CT chest showed pneumonia.  He was started on antibiotics doing well.    Patient is at room air without shortness of breath.  Stable  "for discharge.        Please see above list of diagnoses and related plan for additional information.     Discharge Day Visit / Exam:   Subjective: Some intermittent chest pain on the right side when he is taking deep breaths  Vitals: Blood Pressure: 128/61 (05/21/25 0734)  Pulse: 69 (05/21/25 0734)  Temperature: 98.4 °F (36.9 °C) (05/21/25 0734)  Temp Source: Oral (05/21/25 0734)  Respirations: 17 (05/21/25 0734)  Height: 5' 5\" (165.1 cm) (05/19/25 1003)  Weight - Scale: 73.8 kg (162 lb 9.6 oz) (05/21/25 0434)  SpO2: 92 % (05/21/25 0734)  Physical Exam  Vitals and nursing note reviewed.   Constitutional:       General: He is not in acute distress.     Appearance: He is not diaphoretic.   HENT:      Head: Normocephalic.     Eyes:      General:         Right eye: No discharge.         Left eye: No discharge.       Cardiovascular:      Rate and Rhythm: Normal rate and regular rhythm.      Heart sounds: No murmur heard.  Pulmonary:      Effort: Pulmonary effort is normal. No respiratory distress.      Breath sounds: Normal breath sounds. No wheezing, rhonchi or rales.   Abdominal:      General: There is no distension.      Palpations: Abdomen is soft.      Tenderness: There is no abdominal tenderness. There is no guarding or rebound.     Musculoskeletal:      Cervical back: Normal range of motion.      Right lower leg: No edema.      Left lower leg: No edema.     Skin:     General: Skin is warm.     Neurological:      Mental Status: He is alert and oriented to person, place, and time.     Psychiatric:         Mood and Affect: Mood normal.         Behavior: Behavior normal.         Thought Content: Thought content normal.         Judgment: Judgment normal.          Discussion with Family: Patient declined call to .     Discharge instructions/Information to patient and family:   See after visit summary for information provided to patient and family.      Provisions for Follow-Up Care:  See after visit " summary for information related to follow-up care and any pertinent home health orders.      Mobility at time of Discharge:   Basic Mobility Inpatient Raw Score: 24  JH-HLM Goal: 8: Walk 250 feet or more  JH-HLM Achieved: 8: Walk 250 feet ot more  HLM Goal achieved. Continue to encourage appropriate mobility.     Disposition:   Home    Planned Readmission: no    Administrative Statements   Discharge Statement:  I have spent a total time of 35 minutes in caring for this patient on the day of the visit/encounter. .    **Please Note: This note may have been constructed using a voice recognition system**

## 2025-05-21 NOTE — ASSESSMENT & PLAN NOTE
CT chest showed opacification of the right lower lobe suspicious for pneumonia.  Patient received 2 days of ceftriaxone.  Will discharge on cefdinir for another 5 days to complete total of 7 days of treatment

## 2025-05-21 NOTE — ASSESSMENT & PLAN NOTE
Met SIRS criteria 05/19 w/ fever of 101.1 & leukocytosis  Likely in the setting of pneumonia  Was started on antibiotics  Afebrile, WBC improving    Recent Labs     05/19/25  0456 05/20/25  0349 05/21/25  0430   WBC 11.58* 15.31* 11.17*

## 2025-05-21 NOTE — ASSESSMENT & PLAN NOTE
Presented to ED w/ c/o R flank pain. Pt found to have acute PE on imaging.   CT abd/pelvis:   Segmental and subsegmental RLL pulmonary emboli.   Groundglass density in the posterior right lung base probably represents atelectasis but developing early changes of pulmonary infarction not excluded.   Risk factors: h/o shoulder arthroplasty in 03/2025; no recent travel  ECHO (05/19): EF 50%, grade 1 diastolic dysfunction, hypokinetic segments of apical septal/apex  S/p heparin gtt; transitioned to Eliquis on 05/19   Eliquis copay is affordable for pt   Outpt f/u w/ hematology  Continue Eliquis

## 2025-05-22 NOTE — UTILIZATION REVIEW
NOTIFICATION OF ADMISSION DISCHARGE   This is a Notification of Discharge from [facility]. Please be advised that this patient has been discharge from our facility. Below you will find the admission and discharge date and time including the patient’s disposition.   UTILIZATION REVIEW CONTACT:  Utilization Review Assistants  Network Utilization Review Department  Phone: [phone] x carefully listen to the prompts. All voicemails are confidential.  Email: [email or web address]     ADMISSION INFORMATION  PRESENTATION DATE: 5/18/2025 10:01 AM  OBERVATION ADMISSION DATE: 05/18/2025 1333  INPATIENT ADMISSION DATE: 5/19/25  2:53 PM   DISCHARGE DATE: 5/21/2025  3:00 PM   DISPOSITION:Home/Self Care    Network Utilization Review Department  ATTENTION: Please call with any questions or concerns to [phone] and carefully listen to the prompts so that you are directed to the right person. All voicemails are confidential.   For Discharge needs, contact Care Management DC Support Team at [phone] opt. 2  Send all requests for admission clinical reviews, approved or denied determinations and any other requests to dedicated fax number below belonging to the campus where the patient is receiving treatment. List of dedicated fax numbers for the Facilities:  FACILITY NAME UR FAX NUMBER   ADMISSION DENIALS (Administrative/Medical Necessity) [phone]   DISCHARGE SUPPORT TEAM ([facility]) [phone]   PARENT CHILD HEALTH (Maternity/NICU/Pediatrics) [phone]   [facility] [phone]   [facility] [phone]   [facility] [phone]   [facility] [phone]   [facility] [phone]   [facility] [phone]   [facility] [phone]   [facility] [phone]    [facility] [phone]   [facility] [phone]   [facility] [phone]   [facility] [phone]

## 2025-05-24 LAB
BACTERIA BLD CULT: NORMAL
BACTERIA BLD CULT: NORMAL

## 2025-05-27 ENCOUNTER — TELEPHONE (OUTPATIENT)
Age: 80
End: 2025-05-27

## 2025-05-27 NOTE — TELEPHONE ENCOUNTER
I called patient's number twice to offer a sooner appt with Dr Weinberg for consult however, voicemail is full unable to leave a message and the only other number on chart is spouse, Naomie and her number states disconnected.  No Med Comm Consent on chart.

## 2025-05-27 NOTE — TELEPHONE ENCOUNTER
Patient is scheduled 6/27, but needs to be seen sooner, can someone please assist me with this.  He would like to stay at the [facility].

## 2025-05-29 ENCOUNTER — TELEPHONE (OUTPATIENT)
Age: 80
End: 2025-05-29

## 2025-05-29 NOTE — TELEPHONE ENCOUNTER
Called patient to see patient would like a sooner appt on 6/3 at 8 am to see Dr Weinberg .  Patient will take 6/3 at 8 am with Dr Weinberg.

## 2025-06-03 ENCOUNTER — OFFICE VISIT (OUTPATIENT)
Age: 80
End: 2025-06-03
Attending: INTERNAL MEDICINE
Payer: COMMERCIAL

## 2025-06-03 VITALS
OXYGEN SATURATION: 95 % | RESPIRATION RATE: 16 BRPM | TEMPERATURE: 98.4 F | SYSTOLIC BLOOD PRESSURE: 132 MMHG | WEIGHT: 160.8 LBS | DIASTOLIC BLOOD PRESSURE: 82 MMHG | HEIGHT: 65 IN | HEART RATE: 72 BPM | BODY MASS INDEX: 26.79 KG/M2

## 2025-06-03 DIAGNOSIS — I26.94 MULTIPLE SUBSEGMENTAL PULMONARY EMBOLI WITHOUT ACUTE COR PULMONALE (HCC): Primary | ICD-10-CM

## 2025-06-03 PROCEDURE — 99204 OFFICE O/P NEW MOD 45 MIN: CPT | Performed by: INTERNAL MEDICINE

## 2025-06-03 NOTE — PROGRESS NOTES
Name: Allan Chong      : 1945      MRN: [id number]  Encounter Provider: Efrain Weinberg MD  Encounter Date: 6/3/2025   Encounter department: [facility]  :  Assessment & Plan  Multiple subsegmental pulmonary emboli without acute cor pulmonale (HCC)  79-year-old gentleman who presented on May 18 with right flank pain, and was diagnosed to have segmental and subsegmental right lower lobe pulmonary embolism.  He has no past or family history of blood clots.  Likelihood of an inherited thrombophilia at this age is unlikely.  Patient subsequently developed pneumonia shortly into the hospitalization.  Possible provoked clot from developing pneumonia.  He is currently on Eliquis, and is tolerating this well.    Of note, patient is a non-smoker, and was previously up-to-date with his colonoscopy, which was subsequently halted due to his age.  He is otherwise asymptomatic, and there is no concerns for malignancy on history/physical.  He had an abnormal CT finding in the right kidney.  An ultrasound of the kidney showed a 1 cm indeterminate nodule, which was thought to be a chronic hemorrhagic/proteinaceous cyst.  A repeat renal ultrasound was recommended to be done in 6 months.    I recommend 3 months of anticoagulation for now which would bring us to the third week of August.  Return to clinic before that for reevaluation.  If he has ongoing symptoms, he may not require repeat imaging and a prolonged course of anticoagulation. I do not recommend workup for inherited thrombophilia, unless he has another clot in the future.    Patient has been advised to seek immediate medical attention if he has worsening respiratory symptoms or pain.             Return in about 2 months (around 2025).    History of Present Illness   Chief Complaint   Patient presents with    Consult     79-year-old gentleman being referred for new diagnosis of pulmonary embolism.  Past  "medical history significant for atrial flutter not on anticoagulation, abdominal aortic aneurysm, anxiety, BPH, coronary artery disease, s/p stent placement.     Pertinent Medical History       06/03/25: He continues to have pain in the right flank and occasionally anteriorly on the right lung base.  He denies any cough, shortness of breath, fevers.  There is no bleeding with the Eliquis.  No easy bruising.  He denies any bowel changes, blood in stools, black stools, unintentional weight loss.       Review of Systems   All other systems reviewed and are negative.    Medical History Reviewed by provider this encounter:     .      Objective   /82 (BP Location: Left arm, Patient Position: Sitting, Cuff Size: Adult)   Pulse 72   Temp 98.4 °F (36.9 °C) (Temporal)   Resp 16   Ht 5' 5\" (1.651 m)   Wt 72.9 kg (160 lb 12.8 oz)   SpO2 95%   BMI 26.76 kg/m²     Physical Exam  Vitals reviewed.   Constitutional:       Appearance: Normal appearance.      Comments: He appears younger than stated age.     Cardiovascular:      Rate and Rhythm: Normal rate and regular rhythm.      Heart sounds: No murmur heard.  Pulmonary:      Effort: Pulmonary effort is normal. No respiratory distress.      Breath sounds: Normal breath sounds.   Abdominal:      Palpations: Abdomen is soft.     Musculoskeletal:         General: No swelling.     Neurological:      Mental Status: He is alert.           Labs: I have reviewed the following labs:  Results for orders placed or performed during the hospital encounter of 05/18/25    Specimen: Arm, Right; Blood   Result Value Ref Range    Blood Culture No Growth After 5 Days.     Specimen: Arm, Left; Blood   Result Value Ref Range    Blood Culture No Growth After 5 Days.    FLU/RSV/COVID - if FLU/RSV clinically relevant    Specimen: Nose; Nares   Result Value Ref Range    SARS-CoV-2 Negative Negative    INFLUENZA A PCR Negative Negative    INFLUENZA B PCR Negative Negative    RSV PCR Negative " Negative   Strep Pneumoniae, Urine    Specimen: Urine, Clean Catch   Result Value Ref Range    Strep pneumoniae antigen, urine Negative Negative   Legionella antigen, urine    Specimen: Urine, Clean Catch   Result Value Ref Range    Legionella Urinary Antigen Negative Negative   UA w Reflex to Microscopic w Reflex to Culture    Specimen: Urine, Clean Catch   Result Value Ref Range    Color, UA Yellow     Clarity, UA Clear     Specific Gravity, UA <1.005 (L) 1.005 - 1.030    pH, UA 6.5 4.5, 5.0, 5.5, 6.0, 6.5, 7.0, 7.5, 8.0    Leukocytes, UA Negative Negative    Nitrite, UA Negative Negative    Protein, UA 30 (1+) (A) Negative mg/dl    Glucose, UA Negative Negative mg/dl    Ketones, UA 10 (1+) (A) Negative mg/dl    Urobilinogen, UA <2.0 <2.0 mg/dl mg/dl    Bilirubin, UA Negative Negative    Occult Blood, UA Negative Negative   CBC and differential   Result Value Ref Range    WBC 9.28 4.31 - 10.16 Thousand/uL    RBC 4.53 3.88 - 5.62 Million/uL    Hemoglobin 13.9 12.0 - 17.0 g/dL    Hematocrit 42.6 36.5 - 49.3 %    MCV 94 82 - 98 fL    MCH 30.7 26.8 - 34.3 pg    MCHC 32.6 31.4 - 37.4 g/dL    RDW 13.5 11.6 - 15.1 %    MPV 9.8 8.9 - 12.7 fL    Platelets 177 149 - 390 Thousands/uL    nRBC 0 /100 WBCs    Segmented % 79 (H) 43 - 75 %    Immature Grans % 0 0 - 2 %    Lymphocytes % 7 (L) 14 - 44 %    Monocytes % 13 (H) 4 - 12 %    Eosinophils Relative 1 0 - 6 %    Basophils Relative 0 0 - 1 %    Absolute Neutrophils 7.33 1.85 - 7.62 Thousands/µL    Absolute Immature Grans 0.03 0.00 - 0.20 Thousand/uL    Absolute Lymphocytes 0.67 0.60 - 4.47 Thousands/µL    Absolute Monocytes 1.17 0.17 - 1.22 Thousand/µL    Eosinophils Absolute 0.06 0.00 - 0.61 Thousand/µL    Basophils Absolute 0.02 0.00 - 0.10 Thousands/µL   Comprehensive metabolic panel   Result Value Ref Range    Sodium 137 135 - 147 mmol/L    Potassium 4.6 3.5 - 5.3 mmol/L    Chloride 106 96 - 108 mmol/L    CO2 25 21 - 32 mmol/L    ANION GAP 6 4 - 13 mmol/L    BUN 22 5 -  "25 mg/dL    Creatinine 0.97 0.60 - 1.30 mg/dL    Glucose 110 65 - 140 mg/dL    Calcium 9.0 8.4 - 10.2 mg/dL    AST 17 13 - 39 U/L    ALT 12 7 - 52 U/L    Alkaline Phosphatase 58 34 - 104 U/L    Total Protein 6.6 6.4 - 8.4 g/dL    Albumin 4.0 3.5 - 5.0 g/dL    Total Bilirubin 0.73 0.20 - 1.00 mg/dL    eGFR 72 ml/min/1.73sq m   Result Value Ref Range    Lipase 19 11 - 82 u/L   Urine Microscopic   Result Value Ref Range    RBC, UA None Seen None Seen, 0-1, 1-2, 2-4, 0-5 /hpf    WBC, UA 0-1 None Seen, 0-1, 1-2, 0-5, 2-4 /hpf    Epithelial Cells Occasional None Seen, Occasional /hpf    Bacteria, UA None Seen None Seen, Occasional /hpf   HS Troponin 0hr (reflex protocol)   Result Value Ref Range    hs TnI 0hr 11 \"Refer to ACS Flowchart\"- see link ng/L   B-Type Natriuretic Peptide(BNP)   Result Value Ref Range    BNP 71 0 - 100 pg/mL   Protime-INR   Result Value Ref Range    Protime 14.4 12.3 - 15.0 seconds    INR 1.06 0.85 - 1.19   APTT   Result Value Ref Range    PTT 29 23 - 34 seconds   Lactic acid, plasma (w/reflex if result > 2.0)   Result Value Ref Range    LACTIC ACID 0.8 0.5 - 2.0 mmol/L   Hemoglobin A1C w/ EAG Estimation   Result Value Ref Range    Hemoglobin A1C 5.6 Normal 4.0-5.6%; PreDiabetic 5.7-6.4%; Diabetic >=6.5%; Glycemic control for adults with diabetes <7.0% %     mg/dl   HS Troponin I 2hr   Result Value Ref Range    hs TnI 2hr 14 \"Refer to ACS Flowchart\"- see link ng/L    Delta 2hr hsTnI 3 <20 ng/L   APTT   Result Value Ref Range     (HH) 23 - 34 seconds   APTT   Result Value Ref Range    PTT 98 (H) 23 - 34 seconds   CBC and differential   Result Value Ref Range    WBC 11.58 (H) 4.31 - 10.16 Thousand/uL    RBC 3.86 (L) 3.88 - 5.62 Million/uL    Hemoglobin 11.9 (L) 12.0 - 17.0 g/dL    Hematocrit 36.2 (L) 36.5 - 49.3 %    MCV 94 82 - 98 fL    MCH 30.8 26.8 - 34.3 pg    MCHC 32.9 31.4 - 37.4 g/dL    RDW 13.5 11.6 - 15.1 %    MPV 10.4 8.9 - 12.7 fL    Platelets 157 149 - 390 Thousands/uL    " nRBC 0 /100 WBCs    Segmented % 80 (H) 43 - 75 %    Immature Grans % 1 0 - 2 %    Lymphocytes % 7 (L) 14 - 44 %    Monocytes % 12 4 - 12 %    Eosinophils Relative 0 0 - 6 %    Basophils Relative 0 0 - 1 %    Absolute Neutrophils 9.35 (H) 1.85 - 7.62 Thousands/µL    Absolute Immature Grans 0.07 0.00 - 0.20 Thousand/uL    Absolute Lymphocytes 0.79 0.60 - 4.47 Thousands/µL    Absolute Monocytes 1.33 (H) 0.17 - 1.22 Thousand/µL    Eosinophils Absolute 0.02 0.00 - 0.61 Thousand/µL    Basophils Absolute 0.02 0.00 - 0.10 Thousands/µL   Basic metabolic panel   Result Value Ref Range    Sodium 136 135 - 147 mmol/L    Potassium 3.8 3.5 - 5.3 mmol/L    Chloride 109 (H) 96 - 108 mmol/L    CO2 22 21 - 32 mmol/L    ANION GAP 5 4 - 13 mmol/L    BUN 21 5 - 25 mg/dL    Creatinine 0.89 0.60 - 1.30 mg/dL    Glucose 117 65 - 140 mg/dL    Glucose, Fasting 117 (H) 65 - 99 mg/dL    Calcium 8.1 (L) 8.4 - 10.2 mg/dL    eGFR 80 ml/min/1.73sq m   Result Value Ref Range    Magnesium 1.8 (L) 1.9 - 2.7 mg/dL   APTT   Result Value Ref Range    PTT 66 (H) 23 - 34 seconds   CBC and differential   Result Value Ref Range    WBC 15.31 (H) 4.31 - 10.16 Thousand/uL    RBC 4.26 3.88 - 5.62 Million/uL    Hemoglobin 13.1 12.0 - 17.0 g/dL    Hematocrit 40.1 36.5 - 49.3 %    MCV 94 82 - 98 fL    MCH 30.8 26.8 - 34.3 pg    MCHC 32.7 31.4 - 37.4 g/dL    RDW 13.4 11.6 - 15.1 %    MPV 10.3 8.9 - 12.7 fL    Platelets 176 149 - 390 Thousands/uL    nRBC 0 /100 WBCs    Segmented % 82 (H) 43 - 75 %    Immature Grans % 1 0 - 2 %    Lymphocytes % 8 (L) 14 - 44 %    Monocytes % 9 4 - 12 %    Eosinophils Relative 0 0 - 6 %    Basophils Relative 0 0 - 1 %    Absolute Neutrophils 12.58 (H) 1.85 - 7.62 Thousands/µL    Absolute Immature Grans 0.09 0.00 - 0.20 Thousand/uL    Absolute Lymphocytes 1.17 0.60 - 4.47 Thousands/µL    Absolute Monocytes 1.43 (H) 0.17 - 1.22 Thousand/µL    Eosinophils Absolute 0.01 0.00 - 0.61 Thousand/µL    Basophils Absolute 0.03 0.00 - 0.10  Thousands/µL   Basic metabolic panel   Result Value Ref Range    Sodium 137 135 - 147 mmol/L    Potassium 4.1 3.5 - 5.3 mmol/L    Chloride 106 96 - 108 mmol/L    CO2 21 21 - 32 mmol/L    ANION GAP 10 4 - 13 mmol/L    BUN 24 5 - 25 mg/dL    Creatinine 0.98 0.60 - 1.30 mg/dL    Glucose 85 65 - 140 mg/dL    Calcium 8.5 8.4 - 10.2 mg/dL    eGFR 72 ml/min/1.73sq m   Result Value Ref Range    Magnesium 1.9 1.9 - 2.7 mg/dL   Result Value Ref Range    Procalcitonin 0.59 (H) <=0.25 ng/ml   CBC   Result Value Ref Range    WBC 11.17 (H) 4.31 - 10.16 Thousand/uL    RBC 3.86 (L) 3.88 - 5.62 Million/uL    Hemoglobin 12.0 12.0 - 17.0 g/dL    Hematocrit 36.5 36.5 - 49.3 %    MCV 95 82 - 98 fL    MCH 31.1 26.8 - 34.3 pg    MCHC 32.9 31.4 - 37.4 g/dL    RDW 13.5 11.6 - 15.1 %    Platelets 182 149 - 390 Thousands/uL    MPV 10.0 8.9 - 12.7 fL   Basic metabolic panel   Result Value Ref Range    Sodium 135 135 - 147 mmol/L    Potassium 3.8 3.5 - 5.3 mmol/L    Chloride 105 96 - 108 mmol/L    CO2 22 21 - 32 mmol/L    ANION GAP 8 4 - 13 mmol/L    BUN 19 5 - 25 mg/dL    Creatinine 0.88 0.60 - 1.30 mg/dL    Glucose 99 65 - 140 mg/dL    Calcium 8.3 (L) 8.4 - 10.2 mg/dL    eGFR 80 ml/min/1.73sq m     Results for orders placed or performed during the hospital encounter of 05/18/25    Specimen: Arm, Right; Blood   Result Value Ref Range    Blood Culture No Growth After 5 Days.     Specimen: Arm, Left; Blood   Result Value Ref Range    Blood Culture No Growth After 5 Days.    FLU/RSV/COVID - if FLU/RSV clinically relevant    Specimen: Nose; Nares   Result Value Ref Range    SARS-CoV-2 Negative Negative    INFLUENZA A PCR Negative Negative    INFLUENZA B PCR Negative Negative    RSV PCR Negative Negative   Strep Pneumoniae, Urine    Specimen: Urine, Clean Catch   Result Value Ref Range    Strep pneumoniae antigen, urine Negative Negative   Legionella antigen, urine    Specimen: Urine, Clean Catch   Result Value Ref Range    Legionella Urinary  Antigen Negative Negative   UA w Reflex to Microscopic w Reflex to Culture    Specimen: Urine, Clean Catch   Result Value Ref Range    Color, UA Yellow     Clarity, UA Clear     Specific Gravity, UA <1.005 (L) 1.005 - 1.030    pH, UA 6.5 4.5, 5.0, 5.5, 6.0, 6.5, 7.0, 7.5, 8.0    Leukocytes, UA Negative Negative    Nitrite, UA Negative Negative    Protein, UA 30 (1+) (A) Negative mg/dl    Glucose, UA Negative Negative mg/dl    Ketones, UA 10 (1+) (A) Negative mg/dl    Urobilinogen, UA <2.0 <2.0 mg/dl mg/dl    Bilirubin, UA Negative Negative    Occult Blood, UA Negative Negative   CBC and differential   Result Value Ref Range    WBC 9.28 4.31 - 10.16 Thousand/uL    RBC 4.53 3.88 - 5.62 Million/uL    Hemoglobin 13.9 12.0 - 17.0 g/dL    Hematocrit 42.6 36.5 - 49.3 %    MCV 94 82 - 98 fL    MCH 30.7 26.8 - 34.3 pg    MCHC 32.6 31.4 - 37.4 g/dL    RDW 13.5 11.6 - 15.1 %    MPV 9.8 8.9 - 12.7 fL    Platelets 177 149 - 390 Thousands/uL    nRBC 0 /100 WBCs    Segmented % 79 (H) 43 - 75 %    Immature Grans % 0 0 - 2 %    Lymphocytes % 7 (L) 14 - 44 %    Monocytes % 13 (H) 4 - 12 %    Eosinophils Relative 1 0 - 6 %    Basophils Relative 0 0 - 1 %    Absolute Neutrophils 7.33 1.85 - 7.62 Thousands/µL    Absolute Immature Grans 0.03 0.00 - 0.20 Thousand/uL    Absolute Lymphocytes 0.67 0.60 - 4.47 Thousands/µL    Absolute Monocytes 1.17 0.17 - 1.22 Thousand/µL    Eosinophils Absolute 0.06 0.00 - 0.61 Thousand/µL    Basophils Absolute 0.02 0.00 - 0.10 Thousands/µL   Comprehensive metabolic panel   Result Value Ref Range    Sodium 137 135 - 147 mmol/L    Potassium 4.6 3.5 - 5.3 mmol/L    Chloride 106 96 - 108 mmol/L    CO2 25 21 - 32 mmol/L    ANION GAP 6 4 - 13 mmol/L    BUN 22 5 - 25 mg/dL    Creatinine 0.97 0.60 - 1.30 mg/dL    Glucose 110 65 - 140 mg/dL    Calcium 9.0 8.4 - 10.2 mg/dL    AST 17 13 - 39 U/L    ALT 12 7 - 52 U/L    Alkaline Phosphatase 58 34 - 104 U/L    Total Protein 6.6 6.4 - 8.4 g/dL    Albumin 4.0 3.5 - 5.0  "g/dL    Total Bilirubin 0.73 0.20 - 1.00 mg/dL    eGFR 72 ml/min/1.73sq m   Result Value Ref Range    Lipase 19 11 - 82 u/L   Urine Microscopic   Result Value Ref Range    RBC, UA None Seen None Seen, 0-1, 1-2, 2-4, 0-5 /hpf    WBC, UA 0-1 None Seen, 0-1, 1-2, 0-5, 2-4 /hpf    Epithelial Cells Occasional None Seen, Occasional /hpf    Bacteria, UA None Seen None Seen, Occasional /hpf   HS Troponin 0hr (reflex protocol)   Result Value Ref Range    hs TnI 0hr 11 \"Refer to ACS Flowchart\"- see link ng/L   B-Type Natriuretic Peptide(BNP)   Result Value Ref Range    BNP 71 0 - 100 pg/mL   Protime-INR   Result Value Ref Range    Protime 14.4 12.3 - 15.0 seconds    INR 1.06 0.85 - 1.19   APTT   Result Value Ref Range    PTT 29 23 - 34 seconds   Lactic acid, plasma (w/reflex if result > 2.0)   Result Value Ref Range    LACTIC ACID 0.8 0.5 - 2.0 mmol/L   Hemoglobin A1C w/ EAG Estimation   Result Value Ref Range    Hemoglobin A1C 5.6 Normal 4.0-5.6%; PreDiabetic 5.7-6.4%; Diabetic >=6.5%; Glycemic control for adults with diabetes <7.0% %     mg/dl   HS Troponin I 2hr   Result Value Ref Range    hs TnI 2hr 14 \"Refer to ACS Flowchart\"- see link ng/L    Delta 2hr hsTnI 3 <20 ng/L   APTT   Result Value Ref Range     (HH) 23 - 34 seconds   APTT   Result Value Ref Range    PTT 98 (H) 23 - 34 seconds   CBC and differential   Result Value Ref Range    WBC 11.58 (H) 4.31 - 10.16 Thousand/uL    RBC 3.86 (L) 3.88 - 5.62 Million/uL    Hemoglobin 11.9 (L) 12.0 - 17.0 g/dL    Hematocrit 36.2 (L) 36.5 - 49.3 %    MCV 94 82 - 98 fL    MCH 30.8 26.8 - 34.3 pg    MCHC 32.9 31.4 - 37.4 g/dL    RDW 13.5 11.6 - 15.1 %    MPV 10.4 8.9 - 12.7 fL    Platelets 157 149 - 390 Thousands/uL    nRBC 0 /100 WBCs    Segmented % 80 (H) 43 - 75 %    Immature Grans % 1 0 - 2 %    Lymphocytes % 7 (L) 14 - 44 %    Monocytes % 12 4 - 12 %    Eosinophils Relative 0 0 - 6 %    Basophils Relative 0 0 - 1 %    Absolute Neutrophils 9.35 (H) 1.85 - 7.62 " Thousands/µL    Absolute Immature Grans 0.07 0.00 - 0.20 Thousand/uL    Absolute Lymphocytes 0.79 0.60 - 4.47 Thousands/µL    Absolute Monocytes 1.33 (H) 0.17 - 1.22 Thousand/µL    Eosinophils Absolute 0.02 0.00 - 0.61 Thousand/µL    Basophils Absolute 0.02 0.00 - 0.10 Thousands/µL   Basic metabolic panel   Result Value Ref Range    Sodium 136 135 - 147 mmol/L    Potassium 3.8 3.5 - 5.3 mmol/L    Chloride 109 (H) 96 - 108 mmol/L    CO2 22 21 - 32 mmol/L    ANION GAP 5 4 - 13 mmol/L    BUN 21 5 - 25 mg/dL    Creatinine 0.89 0.60 - 1.30 mg/dL    Glucose 117 65 - 140 mg/dL    Glucose, Fasting 117 (H) 65 - 99 mg/dL    Calcium 8.1 (L) 8.4 - 10.2 mg/dL    eGFR 80 ml/min/1.73sq m   Result Value Ref Range    Magnesium 1.8 (L) 1.9 - 2.7 mg/dL   APTT   Result Value Ref Range    PTT 66 (H) 23 - 34 seconds   CBC and differential   Result Value Ref Range    WBC 15.31 (H) 4.31 - 10.16 Thousand/uL    RBC 4.26 3.88 - 5.62 Million/uL    Hemoglobin 13.1 12.0 - 17.0 g/dL    Hematocrit 40.1 36.5 - 49.3 %    MCV 94 82 - 98 fL    MCH 30.8 26.8 - 34.3 pg    MCHC 32.7 31.4 - 37.4 g/dL    RDW 13.4 11.6 - 15.1 %    MPV 10.3 8.9 - 12.7 fL    Platelets 176 149 - 390 Thousands/uL    nRBC 0 /100 WBCs    Segmented % 82 (H) 43 - 75 %    Immature Grans % 1 0 - 2 %    Lymphocytes % 8 (L) 14 - 44 %    Monocytes % 9 4 - 12 %    Eosinophils Relative 0 0 - 6 %    Basophils Relative 0 0 - 1 %    Absolute Neutrophils 12.58 (H) 1.85 - 7.62 Thousands/µL    Absolute Immature Grans 0.09 0.00 - 0.20 Thousand/uL    Absolute Lymphocytes 1.17 0.60 - 4.47 Thousands/µL    Absolute Monocytes 1.43 (H) 0.17 - 1.22 Thousand/µL    Eosinophils Absolute 0.01 0.00 - 0.61 Thousand/µL    Basophils Absolute 0.03 0.00 - 0.10 Thousands/µL   Basic metabolic panel   Result Value Ref Range    Sodium 137 135 - 147 mmol/L    Potassium 4.1 3.5 - 5.3 mmol/L    Chloride 106 96 - 108 mmol/L    CO2 21 21 - 32 mmol/L    ANION GAP 10 4 - 13 mmol/L    BUN 24 5 - 25 mg/dL    Creatinine 0.98  0.60 - 1.30 mg/dL    Glucose 85 65 - 140 mg/dL    Calcium 8.5 8.4 - 10.2 mg/dL    eGFR 72 ml/min/1.73sq m   Result Value Ref Range    Magnesium 1.9 1.9 - 2.7 mg/dL   Result Value Ref Range    Procalcitonin 0.59 (H) <=0.25 ng/ml   CBC   Result Value Ref Range    WBC 11.17 (H) 4.31 - 10.16 Thousand/uL    RBC 3.86 (L) 3.88 - 5.62 Million/uL    Hemoglobin 12.0 12.0 - 17.0 g/dL    Hematocrit 36.5 36.5 - 49.3 %    MCV 95 82 - 98 fL    MCH 31.1 26.8 - 34.3 pg    MCHC 32.9 31.4 - 37.4 g/dL    RDW 13.5 11.6 - 15.1 %    Platelets 182 149 - 390 Thousands/uL    MPV 10.0 8.9 - 12.7 fL   Basic metabolic panel   Result Value Ref Range    Sodium 135 135 - 147 mmol/L    Potassium 3.8 3.5 - 5.3 mmol/L    Chloride 105 96 - 108 mmol/L    CO2 22 21 - 32 mmol/L    ANION GAP 8 4 - 13 mmol/L    BUN 19 5 - 25 mg/dL    Creatinine 0.88 0.60 - 1.30 mg/dL    Glucose 99 65 - 140 mg/dL    Calcium 8.3 (L) 8.4 - 10.2 mg/dL    eGFR 80 ml/min/1.73sq m        Radiology Results Review: I personally reviewed the following image studies in PACS and associated radiology reports: CT chest. My interpretation of the radiology images/reports is: as noted above.    Administrative Statements   I have spent a total time of 40 minutes in caring for this patient on the day of the visit/encounter including Diagnostic results, Prognosis, Risks and benefits of tx options, Patient and family education, Documenting in the medical record, Reviewing/placing orders in the medical record (including tests, medications, and/or procedures), and Obtaining or reviewing history  .

## 2025-06-03 NOTE — ASSESSMENT & PLAN NOTE
79-year-old gentleman who presented on May 18 with right flank pain, and was diagnosed to have segmental and subsegmental right lower lobe pulmonary embolism.  He has no past or family history of blood clots.  Likelihood of an inherited thrombophilia at this age is unlikely.  Patient subsequently developed pneumonia shortly into the hospitalization.  Possible provoked clot from developing pneumonia.  He is currently on Eliquis, and is tolerating this well.    Of note, patient is a non-smoker, and was previously up-to-date with his colonoscopy, which was subsequently halted due to his age.  He is otherwise asymptomatic, and there is no concerns for malignancy on history/physical.  He had an abnormal CT finding in the right kidney.  An ultrasound of the kidney showed a 1 cm indeterminate nodule, which was thought to be a chronic hemorrhagic/proteinaceous cyst.  A repeat renal ultrasound was recommended to be done in 6 months.    I recommend 3 months of anticoagulation for now which would bring us to the third week of August.  Return to clinic before that for reevaluation.  If he has ongoing symptoms, he may not require repeat imaging and a prolonged course of anticoagulation. I do not recommend workup for inherited thrombophilia, unless he has another clot in the future.    Patient has been advised to seek immediate medical attention if he has worsening respiratory symptoms or pain.

## 2025-06-16 DIAGNOSIS — I26.99 PULMONARY EMBOLISM (HCC): ICD-10-CM

## 2025-06-16 NOTE — TELEPHONE ENCOUNTER
Patient called the RX Refill Line. Message is being forwarded to the office.     Patient is requesting a refill for apixaban (Eliquis) 5 mg prescribed starter pack in hospital. Please advise/send to [facility]     Please contact patient at [phone]

## 2025-06-20 PROBLEM — J18.9 PNEUMONIA DUE TO INFECTIOUS ORGANISM: Status: RESOLVED | Noted: 2025-05-21 | Resolved: 2025-06-20

## 2025-07-22 DIAGNOSIS — I26.99 PULMONARY EMBOLISM (HCC): ICD-10-CM

## 2025-07-22 RX ORDER — APIXABAN 5 MG/1
5 TABLET, FILM COATED ORAL 2 TIMES DAILY
Qty: 30 TABLET | Refills: 0 | Status: SHIPPED | OUTPATIENT
Start: 2025-07-22

## 2025-07-31 ENCOUNTER — TELEPHONE (OUTPATIENT)
Age: 80
End: 2025-07-31

## 2025-07-31 DIAGNOSIS — I26.99 PULMONARY EMBOLISM (HCC): ICD-10-CM

## 2025-08-12 ENCOUNTER — OFFICE VISIT (OUTPATIENT)
Age: 80
End: 2025-08-12
Payer: COMMERCIAL

## 2025-08-18 ENCOUNTER — OFFICE VISIT (OUTPATIENT)
Age: 80
End: 2025-08-18
Payer: COMMERCIAL

## 2025-08-18 VITALS — WEIGHT: 162.4 LBS | HEIGHT: 65 IN | BODY MASS INDEX: 27.06 KG/M2

## 2025-08-18 DIAGNOSIS — Z98.890 S/P LEFT ROTATOR CUFF REPAIR: Primary | ICD-10-CM

## 2025-08-18 PROCEDURE — 99212 OFFICE O/P EST SF 10 MIN: CPT | Performed by: PHYSICIAN ASSISTANT

## 2025-08-18 RX ORDER — APIXABAN 5 MG (74)
KIT ORAL
COMMUNITY
Start: 2025-05-22

## 2025-08-18 RX ORDER — LISINOPRIL 5 MG/1
1 TABLET ORAL DAILY
COMMUNITY
Start: 2025-08-05